# Patient Record
Sex: FEMALE | Race: WHITE | NOT HISPANIC OR LATINO | Employment: FULL TIME | URBAN - NONMETROPOLITAN AREA
[De-identification: names, ages, dates, MRNs, and addresses within clinical notes are randomized per-mention and may not be internally consistent; named-entity substitution may affect disease eponyms.]

---

## 2017-05-26 ENCOUNTER — TRANSCRIBE ORDERS (OUTPATIENT)
Dept: ADMINISTRATIVE | Facility: HOSPITAL | Age: 47
End: 2017-05-26

## 2017-06-06 ENCOUNTER — HOSPITAL ENCOUNTER (OUTPATIENT)
Dept: NEUROLOGY | Age: 47
Discharge: HOME | End: 2017-06-06
Payer: COMMERCIAL

## 2017-06-06 PROCEDURE — 95886 MUSC TEST DONE W/N TEST COMP: CPT | Performed by: PSYCHIATRY & NEUROLOGY

## 2017-06-06 PROCEDURE — 95886 MUSC TEST DONE W/N TEST COMP: CPT

## 2017-06-06 PROCEDURE — 95911 NRV CNDJ TEST 9-10 STUDIES: CPT

## 2017-06-06 PROCEDURE — 95911 NRV CNDJ TEST 9-10 STUDIES: CPT | Performed by: PSYCHIATRY & NEUROLOGY

## 2017-06-09 ENCOUNTER — TRANSCRIBE ORDERS (OUTPATIENT)
Dept: ADMINISTRATIVE | Facility: HOSPITAL | Age: 47
End: 2017-06-09

## 2017-06-09 DIAGNOSIS — R79.89 ELEVATED PTHRP LEVEL: Primary | ICD-10-CM

## 2017-06-19 ENCOUNTER — APPOINTMENT (OUTPATIENT)
Dept: NUCLEAR MEDICINE | Facility: HOSPITAL | Age: 47
End: 2017-06-19
Attending: FAMILY MEDICINE

## 2017-08-01 ENCOUNTER — HOSPITAL ENCOUNTER (OUTPATIENT)
Dept: MRI IMAGING | Age: 47
Discharge: HOME | End: 2017-08-01
Payer: COMMERCIAL

## 2017-08-01 DIAGNOSIS — M19.012 ARTHRITIS OF LEFT SHOULDER REGION: ICD-10-CM

## 2017-10-17 ENCOUNTER — HOSPITAL ENCOUNTER (OUTPATIENT)
Dept: NUCLEAR MEDICINE | Facility: HOSPITAL | Age: 47
Discharge: HOME | End: 2017-10-17
Attending: FAMILY MEDICINE

## 2017-10-17 DIAGNOSIS — R79.89 ELEVATED PTHRP LEVEL: ICD-10-CM

## 2017-10-17 PROCEDURE — 0 TECHNETIUM SESTAMIBI: Performed by: FAMILY MEDICINE

## 2017-10-17 PROCEDURE — 78071 PARATHYRD PLANAR W/WO SUBTRJ: CPT

## 2017-10-17 PROCEDURE — A9500 TC99M SESTAMIBI: HCPCS | Performed by: FAMILY MEDICINE

## 2017-10-17 PROCEDURE — 78070 PARATHYROID PLANAR IMAGING: CPT

## 2017-10-17 RX ADMIN — TECHNETIUM TC-99M SESTAMIBI 1 DOSE: 1 INJECTION INTRAVENOUS at 08:30

## 2017-10-26 ENCOUNTER — HOSPITAL ENCOUNTER (OUTPATIENT)
Dept: LAB | Age: 47
Discharge: HOME | End: 2017-10-26
Payer: COMMERCIAL

## 2017-10-26 ENCOUNTER — APPOINTMENT (OUTPATIENT)
Dept: LAB | Age: 47
End: 2017-10-26
Payer: COMMERCIAL

## 2017-10-26 LAB
24HR URINE VOLUME (ML): 900 ML
CALCIUM 24 HOUR URINE: 245 MG/24HR (ref 100–300)
CREATININE 24 HOUR URINE: 1.6 G/24HR (ref 1–2)
SODIUM 24 HOUR URINE: 92 MMOL/24 HR (ref 40–220)

## 2017-10-26 PROCEDURE — 84300 ASSAY OF URINE SODIUM: CPT

## 2017-10-26 PROCEDURE — 82340 ASSAY OF CALCIUM IN URINE: CPT

## 2018-01-10 ENCOUNTER — TRANSCRIBE ORDERS (OUTPATIENT)
Dept: ADMINISTRATIVE | Facility: HOSPITAL | Age: 48
End: 2018-01-10

## 2018-01-10 DIAGNOSIS — D47.2 MONOCLONAL GAMMOPATHY: ICD-10-CM

## 2018-01-10 DIAGNOSIS — D72.829 LEUKOCYTOSIS, UNSPECIFIED TYPE: Primary | ICD-10-CM

## 2018-01-23 ENCOUNTER — HOSPITAL ENCOUNTER (OUTPATIENT)
Dept: CT IMAGING | Facility: HOSPITAL | Age: 48
Discharge: HOME | End: 2018-01-23
Attending: INTERNAL MEDICINE | Admitting: INTERNAL MEDICINE

## 2018-01-23 VITALS
WEIGHT: 293 LBS | OXYGEN SATURATION: 99 % | HEIGHT: 64 IN | TEMPERATURE: 98.7 F | BODY MASS INDEX: 50.02 KG/M2 | SYSTOLIC BLOOD PRESSURE: 142 MMHG | RESPIRATION RATE: 18 BRPM | DIASTOLIC BLOOD PRESSURE: 92 MMHG | HEART RATE: 99 BPM

## 2018-01-23 DIAGNOSIS — D47.2 MONOCLONAL GAMMOPATHY: ICD-10-CM

## 2018-01-23 DIAGNOSIS — D72.829 LEUKOCYTOSIS, UNSPECIFIED TYPE: ICD-10-CM

## 2018-01-23 LAB
APTT PPP: 25.8 SECONDS (ref 24.1–34.8)
INR PPP: 0.92 (ref 0.91–1.09)
PLATELET # BLD AUTO: 301 10*3/MM3 (ref 130–400)
PROTHROMBIN TIME: 12.6 SECONDS (ref 11.9–14.6)

## 2018-01-23 PROCEDURE — 85610 PROTHROMBIN TIME: CPT | Performed by: INTERNAL MEDICINE

## 2018-01-23 PROCEDURE — 88313 SPECIAL STAINS GROUP 2: CPT | Performed by: INTERNAL MEDICINE

## 2018-01-23 PROCEDURE — 88311 DECALCIFY TISSUE: CPT | Performed by: INTERNAL MEDICINE

## 2018-01-23 PROCEDURE — 25010000002 MIDAZOLAM PER 1 MG: Performed by: RADIOLOGY

## 2018-01-23 PROCEDURE — 85049 AUTOMATED PLATELET COUNT: CPT | Performed by: INTERNAL MEDICINE

## 2018-01-23 PROCEDURE — 88305 TISSUE EXAM BY PATHOLOGIST: CPT | Performed by: INTERNAL MEDICINE

## 2018-01-23 PROCEDURE — 77012 CT SCAN FOR NEEDLE BIOPSY: CPT

## 2018-01-23 PROCEDURE — 25010000002 FENTANYL CITRATE (PF) 100 MCG/2ML SOLUTION: Performed by: RADIOLOGY

## 2018-01-23 PROCEDURE — 85730 THROMBOPLASTIN TIME PARTIAL: CPT | Performed by: INTERNAL MEDICINE

## 2018-01-23 RX ORDER — MIDAZOLAM HYDROCHLORIDE 1 MG/ML
INJECTION INTRAMUSCULAR; INTRAVENOUS
Status: COMPLETED | OUTPATIENT
Start: 2018-01-23 | End: 2018-01-23

## 2018-01-23 RX ORDER — SODIUM CHLORIDE 0.9 % (FLUSH) 0.9 %
1-10 SYRINGE (ML) INJECTION AS NEEDED
Status: DISCONTINUED | OUTPATIENT
Start: 2018-01-23 | End: 2018-01-24 | Stop reason: HOSPADM

## 2018-01-23 RX ORDER — ERGOCALCIFEROL 1.25 MG/1
50000 CAPSULE ORAL WEEKLY
COMMUNITY
End: 2020-03-03

## 2018-01-23 RX ORDER — LIDOCAINE HYDROCHLORIDE 10 MG/ML
INJECTION, SOLUTION INFILTRATION; PERINEURAL
Status: COMPLETED | OUTPATIENT
Start: 2018-01-23 | End: 2018-01-23

## 2018-01-23 RX ORDER — MONTELUKAST SODIUM 10 MG/1
10 TABLET ORAL NIGHTLY
COMMUNITY
End: 2020-03-03

## 2018-01-23 RX ORDER — FENTANYL CITRATE 50 UG/ML
INJECTION, SOLUTION INTRAMUSCULAR; INTRAVENOUS
Status: COMPLETED | OUTPATIENT
Start: 2018-01-23 | End: 2018-01-23

## 2018-01-23 RX ORDER — PAROXETINE 10 MG/1
10 TABLET, FILM COATED ORAL EVERY MORNING
COMMUNITY
End: 2020-03-03

## 2018-01-23 RX ADMIN — FENTANYL CITRATE 50 MCG: 50 INJECTION, SOLUTION INTRAMUSCULAR; INTRAVENOUS at 10:06

## 2018-01-23 RX ADMIN — MIDAZOLAM 1 MG: 1 INJECTION INTRAMUSCULAR; INTRAVENOUS at 10:11

## 2018-01-23 RX ADMIN — FENTANYL CITRATE 25 MCG: 50 INJECTION, SOLUTION INTRAMUSCULAR; INTRAVENOUS at 10:15

## 2018-01-23 RX ADMIN — FENTANYL CITRATE 25 MCG: 50 INJECTION, SOLUTION INTRAMUSCULAR; INTRAVENOUS at 10:11

## 2018-01-23 RX ADMIN — LIDOCAINE HYDROCHLORIDE 5 ML: 10 INJECTION, SOLUTION INFILTRATION; PERINEURAL at 10:06

## 2018-01-23 RX ADMIN — MIDAZOLAM 1 MG: 1 INJECTION INTRAMUSCULAR; INTRAVENOUS at 10:06

## 2018-03-30 ENCOUNTER — HOSPITAL ENCOUNTER (EMERGENCY)
Facility: HOSPITAL | Age: 48
Discharge: HOME | End: 2018-03-30
Attending: EMERGENCY MEDICINE | Admitting: EMERGENCY MEDICINE

## 2018-03-30 ENCOUNTER — APPOINTMENT (OUTPATIENT)
Dept: GENERAL RADIOLOGY | Facility: HOSPITAL | Age: 48
End: 2018-03-30

## 2018-03-30 VITALS
BODY MASS INDEX: 50.02 KG/M2 | WEIGHT: 293 LBS | HEART RATE: 79 BPM | DIASTOLIC BLOOD PRESSURE: 67 MMHG | TEMPERATURE: 96.5 F | HEIGHT: 64 IN | OXYGEN SATURATION: 94 % | SYSTOLIC BLOOD PRESSURE: 124 MMHG | RESPIRATION RATE: 16 BRPM

## 2018-03-30 DIAGNOSIS — I47.1 SVT (SUPRAVENTRICULAR TACHYCARDIA) (HCC): Primary | ICD-10-CM

## 2018-03-30 LAB
ALBUMIN SERPL-MCNC: 4.2 G/DL (ref 3.5–5)
ALBUMIN/GLOB SERPL: 1 G/DL (ref 1.1–2.5)
ALP SERPL-CCNC: 116 U/L (ref 24–120)
ALT SERPL W P-5'-P-CCNC: 115 U/L (ref 0–54)
ANION GAP SERPL CALCULATED.3IONS-SCNC: 12 MMOL/L (ref 4–13)
AST SERPL-CCNC: 112 U/L (ref 7–45)
BASOPHILS # BLD AUTO: 0.04 10*3/MM3 (ref 0–0.2)
BASOPHILS NFR BLD AUTO: 0.3 % (ref 0–2)
BILIRUB SERPL-MCNC: 0.5 MG/DL (ref 0.1–1)
BUN BLD-MCNC: 7 MG/DL (ref 5–21)
BUN/CREAT SERPL: 11.3 (ref 7–25)
CALCIUM SPEC-SCNC: 9 MG/DL (ref 8.4–10.4)
CHLORIDE SERPL-SCNC: 101 MMOL/L (ref 98–110)
CO2 SERPL-SCNC: 29 MMOL/L (ref 24–31)
CREAT BLD-MCNC: 0.62 MG/DL (ref 0.5–1.4)
DEPRECATED RDW RBC AUTO: 41.7 FL (ref 40–54)
EOSINOPHIL # BLD AUTO: 0.12 10*3/MM3 (ref 0–0.7)
EOSINOPHIL NFR BLD AUTO: 1 % (ref 0–4)
ERYTHROCYTE [DISTWIDTH] IN BLOOD BY AUTOMATED COUNT: 13.3 % (ref 12–15)
GFR SERPL CREATININE-BSD FRML MDRD: 104 ML/MIN/1.73
GLOBULIN UR ELPH-MCNC: 4.4 GM/DL
GLUCOSE BLD-MCNC: 168 MG/DL (ref 70–100)
HCT VFR BLD AUTO: 46 % (ref 37–47)
HGB BLD-MCNC: 15.1 G/DL (ref 12–16)
IMM GRANULOCYTES # BLD: 0.06 10*3/MM3 (ref 0–0.03)
IMM GRANULOCYTES NFR BLD: 0.5 % (ref 0–5)
LYMPHOCYTES # BLD AUTO: 1.79 10*3/MM3 (ref 0.72–4.86)
LYMPHOCYTES NFR BLD AUTO: 15.6 % (ref 15–45)
MCH RBC QN AUTO: 28.2 PG (ref 28–32)
MCHC RBC AUTO-ENTMCNC: 32.8 G/DL (ref 33–36)
MCV RBC AUTO: 85.8 FL (ref 82–98)
MONOCYTES # BLD AUTO: 0.68 10*3/MM3 (ref 0.19–1.3)
MONOCYTES NFR BLD AUTO: 5.9 % (ref 4–12)
NEUTROPHILS # BLD AUTO: 8.76 10*3/MM3 (ref 1.87–8.4)
NEUTROPHILS NFR BLD AUTO: 76.7 % (ref 39–78)
NRBC BLD MANUAL-RTO: 0 /100 WBC (ref 0–0)
PLATELET # BLD AUTO: 347 10*3/MM3 (ref 130–400)
PMV BLD AUTO: 9.8 FL (ref 6–12)
POTASSIUM BLD-SCNC: 4 MMOL/L (ref 3.5–5.3)
PROT SERPL-MCNC: 8.6 G/DL (ref 6.3–8.7)
RBC # BLD AUTO: 5.36 10*6/MM3 (ref 4.2–5.4)
SODIUM BLD-SCNC: 142 MMOL/L (ref 135–145)
TSH SERPL DL<=0.05 MIU/L-ACNC: 0.14 MIU/ML (ref 0.47–4.68)
WBC NRBC COR # BLD: 11.45 10*3/MM3 (ref 4.8–10.8)

## 2018-03-30 PROCEDURE — 93010 ELECTROCARDIOGRAM REPORT: CPT | Performed by: INTERNAL MEDICINE

## 2018-03-30 PROCEDURE — 84443 ASSAY THYROID STIM HORMONE: CPT | Performed by: EMERGENCY MEDICINE

## 2018-03-30 PROCEDURE — 93005 ELECTROCARDIOGRAM TRACING: CPT | Performed by: EMERGENCY MEDICINE

## 2018-03-30 PROCEDURE — 71045 X-RAY EXAM CHEST 1 VIEW: CPT

## 2018-03-30 PROCEDURE — 85025 COMPLETE CBC W/AUTO DIFF WBC: CPT | Performed by: EMERGENCY MEDICINE

## 2018-03-30 PROCEDURE — 36415 COLL VENOUS BLD VENIPUNCTURE: CPT | Performed by: EMERGENCY MEDICINE

## 2018-03-30 PROCEDURE — 25010000002 ADENOSINE PER 6 MG: Performed by: EMERGENCY MEDICINE

## 2018-03-30 PROCEDURE — 99284 EMERGENCY DEPT VISIT MOD MDM: CPT

## 2018-03-30 PROCEDURE — 96374 THER/PROPH/DIAG INJ IV PUSH: CPT

## 2018-03-30 PROCEDURE — 80053 COMPREHEN METABOLIC PANEL: CPT | Performed by: EMERGENCY MEDICINE

## 2018-03-30 RX ORDER — HYDROXYZINE PAMOATE 50 MG/1
50 CAPSULE ORAL 3 TIMES DAILY PRN
Qty: 10 CAPSULE | Refills: 0 | Status: SHIPPED | OUTPATIENT
Start: 2018-03-30 | End: 2020-03-03

## 2018-03-30 RX ORDER — METOPROLOL SUCCINATE 25 MG/1
50 TABLET, EXTENDED RELEASE ORAL 2 TIMES DAILY
COMMUNITY

## 2018-03-30 RX ORDER — ADENOSINE 3 MG/ML
6 INJECTION, SOLUTION INTRAVENOUS ONCE
Status: COMPLETED | OUTPATIENT
Start: 2018-03-30 | End: 2018-03-30

## 2018-03-30 RX ADMIN — ADENOSINE 6 MG: 3 INJECTION INTRAVENOUS at 08:25

## 2018-04-03 NOTE — ED NOTES
"ED Call Back Questions    1. How are you doing since leaving the Emergency Department?    Doing ok, better than I was  2. Do you have any questions about your discharge instructions? No     3. Have you filled your new prescriptions yet? Yes   a. Do you have any questions about those medications? No     4. Were you able to make a follow-up appointment with the physician? Yes     5. Do you have a primary care physician? Yes   a. If No, would you like for me to set you up with one? N/A  i. If Yes, “I will have our ED  give you a call right back at this number to work with you on the best time for an appointment.”    6. We are always looking to get better at what we do. Do you have any suggestions for what we can do to be even better? No   a. If Yes, \"Thank you for sharing your concerns. I apologize. I will follow up with our manager and patient . Would you like someone to call you back?\" No     7. Is there anything else I can do for you? No   Visit was good     Jethro Buenrostro  04/03/18 1239    "

## 2018-04-04 ENCOUNTER — TRANSCRIBE ORDERS (OUTPATIENT)
Dept: ADMINISTRATIVE | Facility: HOSPITAL | Age: 48
End: 2018-04-04

## 2018-04-04 DIAGNOSIS — D47.2 MONOCLONAL GAMMOPATHY: Primary | ICD-10-CM

## 2018-04-11 ENCOUNTER — HOSPITAL ENCOUNTER (OUTPATIENT)
Dept: CT IMAGING | Facility: HOSPITAL | Age: 48
Discharge: HOME | End: 2018-04-11
Attending: INTERNAL MEDICINE | Admitting: RADIOLOGY

## 2018-04-11 VITALS
WEIGHT: 293 LBS | RESPIRATION RATE: 16 BRPM | SYSTOLIC BLOOD PRESSURE: 136 MMHG | OXYGEN SATURATION: 98 % | BODY MASS INDEX: 50.02 KG/M2 | HEART RATE: 84 BPM | DIASTOLIC BLOOD PRESSURE: 99 MMHG | HEIGHT: 64 IN

## 2018-04-11 DIAGNOSIS — D47.2 MONOCLONAL GAMMOPATHY: ICD-10-CM

## 2018-04-11 LAB
APTT PPP: 21.3 SECONDS (ref 24.1–34.8)
BASOPHILS # BLD AUTO: 0.04 10*3/MM3 (ref 0–0.2)
BASOPHILS NFR BLD AUTO: 0.5 % (ref 0–2)
DEPRECATED RDW RBC AUTO: 40.2 FL (ref 40–54)
EOSINOPHIL # BLD AUTO: 0.17 10*3/MM3 (ref 0–0.7)
EOSINOPHIL NFR BLD AUTO: 2.1 % (ref 0–4)
ERYTHROCYTE [DISTWIDTH] IN BLOOD BY AUTOMATED COUNT: 13 % (ref 12–15)
HCT VFR BLD AUTO: 43.5 % (ref 37–47)
HGB BLD-MCNC: 14.1 G/DL (ref 12–16)
IMM GRANULOCYTES # BLD: 0.05 10*3/MM3 (ref 0–0.03)
IMM GRANULOCYTES NFR BLD: 0.6 % (ref 0–5)
INR PPP: 0.87 (ref 0.91–1.09)
LYMPHOCYTES # BLD AUTO: 1.71 10*3/MM3 (ref 0.72–4.86)
LYMPHOCYTES NFR BLD AUTO: 21.1 % (ref 15–45)
MCH RBC QN AUTO: 27.8 PG (ref 28–32)
MCHC RBC AUTO-ENTMCNC: 32.4 G/DL (ref 33–36)
MCV RBC AUTO: 85.6 FL (ref 82–98)
MONOCYTES # BLD AUTO: 0.47 10*3/MM3 (ref 0.19–1.3)
MONOCYTES NFR BLD AUTO: 5.8 % (ref 4–12)
NEUTROPHILS # BLD AUTO: 5.67 10*3/MM3 (ref 1.87–8.4)
NEUTROPHILS NFR BLD AUTO: 69.9 % (ref 39–78)
NRBC BLD MANUAL-RTO: 0 /100 WBC (ref 0–0)
PLATELET # BLD AUTO: 300 10*3/MM3 (ref 130–400)
PMV BLD AUTO: 9.7 FL (ref 6–12)
PROTHROMBIN TIME: 12.1 SECONDS (ref 11.9–14.6)
RBC # BLD AUTO: 5.08 10*6/MM3 (ref 4.2–5.4)
WBC NRBC COR # BLD: 8.11 10*3/MM3 (ref 4.8–10.8)

## 2018-04-11 PROCEDURE — 88313 SPECIAL STAINS GROUP 2: CPT | Performed by: INTERNAL MEDICINE

## 2018-04-11 PROCEDURE — 85610 PROTHROMBIN TIME: CPT | Performed by: INTERNAL MEDICINE

## 2018-04-11 PROCEDURE — 85025 COMPLETE CBC W/AUTO DIFF WBC: CPT | Performed by: INTERNAL MEDICINE

## 2018-04-11 PROCEDURE — 88311 DECALCIFY TISSUE: CPT | Performed by: INTERNAL MEDICINE

## 2018-04-11 PROCEDURE — 88305 TISSUE EXAM BY PATHOLOGIST: CPT | Performed by: INTERNAL MEDICINE

## 2018-04-11 PROCEDURE — 36415 COLL VENOUS BLD VENIPUNCTURE: CPT

## 2018-04-11 PROCEDURE — 85730 THROMBOPLASTIN TIME PARTIAL: CPT | Performed by: INTERNAL MEDICINE

## 2018-04-11 PROCEDURE — 25010000002 MIDAZOLAM PER 1 MG: Performed by: RADIOLOGY

## 2018-04-11 PROCEDURE — 25010000002 FENTANYL CITRATE (PF) 100 MCG/2ML SOLUTION: Performed by: RADIOLOGY

## 2018-04-11 PROCEDURE — 77012 CT SCAN FOR NEEDLE BIOPSY: CPT

## 2018-04-11 RX ORDER — MIDAZOLAM HYDROCHLORIDE 1 MG/ML
INJECTION INTRAMUSCULAR; INTRAVENOUS
Status: COMPLETED | OUTPATIENT
Start: 2018-04-11 | End: 2018-04-11

## 2018-04-11 RX ORDER — SODIUM CHLORIDE 0.9 % (FLUSH) 0.9 %
1-10 SYRINGE (ML) INJECTION AS NEEDED
Status: CANCELLED | OUTPATIENT
Start: 2018-04-11

## 2018-04-11 RX ORDER — LIDOCAINE HYDROCHLORIDE 10 MG/ML
INJECTION, SOLUTION INFILTRATION; PERINEURAL
Status: COMPLETED | OUTPATIENT
Start: 2018-04-11 | End: 2018-04-11

## 2018-04-11 RX ORDER — FENTANYL CITRATE 50 UG/ML
INJECTION, SOLUTION INTRAMUSCULAR; INTRAVENOUS
Status: COMPLETED | OUTPATIENT
Start: 2018-04-11 | End: 2018-04-11

## 2018-04-11 RX ADMIN — LIDOCAINE HYDROCHLORIDE 20 ML: 10 INJECTION, SOLUTION INFILTRATION; PERINEURAL at 11:21

## 2018-04-11 RX ADMIN — FENTANYL CITRATE 50 MCG: 50 INJECTION, SOLUTION INTRAMUSCULAR; INTRAVENOUS at 11:14

## 2018-04-11 RX ADMIN — FENTANYL CITRATE 25 MCG: 50 INJECTION, SOLUTION INTRAMUSCULAR; INTRAVENOUS at 11:22

## 2018-04-11 RX ADMIN — MIDAZOLAM 2 MG: 1 INJECTION INTRAMUSCULAR; INTRAVENOUS at 11:14

## 2018-04-11 NOTE — NURSING NOTE
Patient tolerated procedure well, no adversities, area covered with bandaid, no oozing or hematoma. MD gave instruction to patient and patient verbalized understanding.

## 2018-04-11 NOTE — PRE-PROCEDURE NOTE
This patient has been assessed in the Radiology Department prior to a CT guided bone marrow biopsy.  The airway has been assessed and the ASA/Mallampati score has been documented in Epic.  The H&P is in [facility], has been reviewed, and there is no significant change.  Current lab values have been reviewed.  Risks, benefits, and alternatives have been reviewed.

## 2018-04-18 LAB
CYTO UR: NORMAL
LAB AP CASE REPORT: NORMAL
Lab: NORMAL
Lab: NORMAL
PATH REPORT.ADDENDUM SPEC: NORMAL
PATH REPORT.FINAL DX SPEC: NORMAL
PATH REPORT.GROSS SPEC: NORMAL

## 2018-05-21 NOTE — PROGRESS NOTES
YOB: 1970  Location: [facility]  Location Address: [address] [place] [address], [facility], [address] [place] [address]  Location Phone: [phone]    Chief Complaint   Patient presents with   • parathyroid       History of Present Illness  Arminda Mccloud is a 48 y.o. female.  Arminda Mccloud is here for evaluation of ENT complaints. The patient has had problems with hyperthyroidism, dysphagia and neck tightness and history of SVT. The symptoms are not localized to a particular location. The patient has had moderate symptoms. The symptoms have been present for the last several years. The symptoms are aggravated by  no identifiable factors. The symptoms are improved by no identifiable factors.      Study Result     EXAMINATION: Parathyroid scan 10/17/2017     HISTORY: Elevated parathyroid hormone with low calcium level x5 months.     FINDINGS: Following intravenous injection of the radiopharmaceutical  planar images were obtained over the neck and upper chest both  immediately and following a delay. SPECT imaging was obtained on the  delayed sequences. There is normal physiologic uptake of the  radiopharmaceutical within the thyroid gland. Washout phase sequencing  demonstrates uptake within the parotid and submandibular glands. No  focal intense areas of abnormal uptake are identified to suggest  parathyroid adenoma.     IMPRESSION:  . Normal parathyroid scan with no evidence of abnormal foci  of activity on the delayed images to suggest a parathyroid adenoma.  This report was finalized on 10/17/2017 14:00 by Dr. Facundo David MD.         3/30/18   Calcium 8.4 - 10.4 mg/dL 9.0      TSH 0.470 - 4.680 mIU/mL 0.143       18 PTH 73     Past Medical History:   Diagnosis Date   • Arthritis    • Chronic fatigue    • Depression    • Generalized anxiety disorder    • Goiter 2018   • Hypercalcemia    • Hyperparathyroidism    • Hypothyroidism    • Irritable bowel syndrome    • Multinodular goiter    • Obesity     • Onychomycosis    • Panic    • Supraventricular tachycardia    • Tendonitis    • Vitamin D deficiency        Past Surgical History:   Procedure Laterality Date   • BONY PELVIS SURGERY     • CHOLECYSTECTOMY     • HAND SURGERY     • JOINT REPLACEMENT Left     knee   • TONSILLECTOMY AND ADENOIDECTOMY         Outpatient Prescriptions Marked as Taking for the 5/22/18 encounter (Office Visit) with Hal Velazquez MD   Medication Sig Dispense Refill   • hydrOXYzine (VISTARIL) 50 MG capsule Take 1 capsule by mouth 3 (Three) Times a Day As Needed for Itching. 10 capsule 0   • metoprolol succinate XL (TOPROL-XL) 25 MG 24 hr tablet Take 25 mg by mouth 2 (Two) Times a Day.     • montelukast (SINGULAIR) 10 MG tablet Take 10 mg by mouth Every Night.     • PARoxetine (PAXIL) 10 MG tablet Take 10 mg by mouth Every Morning.     • vitamin D (ERGOCALCIFEROL) 84238 units capsule capsule Take 50,000 Units by mouth 1 (One) Time Per Week.         Lamisil [terbinafine hcl] and Buspirone    Family History   Problem Relation Age of Onset   • No Known Problems Mother    • Hypertension Father    • Diabetes Paternal Grandmother    • Breast cancer Paternal Aunt        Social History     Social History   • Marital status: Single     Spouse name: N/A   • Number of children: N/A   • Years of education: N/A     Occupational History   • Not on file.     Social History Main Topics   • Smoking status: Never Smoker   • Smokeless tobacco: Never Used   • Alcohol use No   • Drug use: No   • Sexual activity: Not on file     Other Topics Concern   • Not on file     Social History Narrative   • No narrative on file       Review of Systems   Constitutional: Negative for activity change, appetite change, chills, diaphoresis, fatigue, fever and unexpected weight change.   HENT: Positive for trouble swallowing. Negative for congestion, dental problem, drooling, ear discharge, ear pain, facial swelling, hearing loss, mouth sores, nosebleeds, postnasal drip,  rhinorrhea, sinus pressure, sneezing, sore throat, tinnitus and voice change.         Goiter  Hyperthyoidism   Eyes: Negative.    Respiratory: Negative.    Cardiovascular:        SVT   Gastrointestinal: Negative.    Endocrine: Negative.    Skin: Negative.    Allergic/Immunologic: Negative for environmental allergies, food allergies and immunocompromised state.   Neurological: Negative.    Hematological: Negative.    Psychiatric/Behavioral: Negative.        Vitals:    05/22/18 1338   BP: 150/90   Pulse: 80   Temp: 98 °F (36.7 °C)       Body mass index is 54.58 kg/m².    Objective     Physical Exam  CONSTITUTIONAL: well nourished, alert, oriented, in no acute distress     COMMUNICATION AND VOICE: able to communicate normally, normal voice quality    HEAD: normocephalic, no lesions, atraumatic, no tenderness, no masses     FACE: appearance normal, no lesions, no tenderness, no deformities, facial motion symmetric    SALIVARY GLANDS: parotid glands with no tenderness, no swelling, no masses, submandibular glands with normal size, nontender    EYES: ocular motility normal, eyelids normal, orbits normal, no proptosis, conjunctiva normal , pupils equal, round     EARS:  Hearing: response to conversational voice normal bilaterally   External Ears: auricles without lesions  Otoscopic: tympanic membrane appearance normal, no lesions, no perforation, normal mobility, no fluid    NOSE:  External Nose: structure normal, no tenderness on palpation, no nasal discharge, no lesions, no evidence of trauma, nostrils patent   Intranasal Exam: nasal mucosa normal, vestibule within normal limits, inferior turbinate normal, nasal septum midline   Nasopharynx:     ORAL:  Lips: upper and lower lips without lesion   Teeth: dentition within normal limits for age   Gums: gingivae healthy   Oral Mucosa: oral mucosa normal, no mucosal lesions   Floor of Mouth: Warthin’s duct patent, mucosa normal  Tongue: lingual mucosa normal without lesions,  normal tongue mobility   Palate: soft and hard palates with normal mucosa and structure  Oropharynx: oropharyngeal mucosa normal    NECK: neck appearance normal, no mass,  noted without erythema or tenderness    THYROID: mild thyromegaly, no tenderness, nodule on left lobe felt, position midline     LYMPH NODES: no lymphadenopathy    CHEST/RESPIRATORY: respiratory effort normal, normal breath sounds     CARDIOVASCULAR: rate and rhythm normal, extremities without cyanosis or edema      NEUROLOGIC/PSYCHIATRIC: oriented to time, place and person, mood normal, affect appropriate, CN II-XII intact grossly    Assessment/Plan   Problems Addressed this Visit        Endocrine    Goiter - Primary    Relevant Orders    US Thyroid (Completed)    US Guided Thyroid Biopsy    Multinodular goiter    Relevant Orders    US Guided Thyroid Biopsy    Hyperthyroidism    Relevant Orders    US Guided Thyroid Biopsy    NM Thyroid Uptake & Scan        * Surgery not found *  Orders Placed This Encounter   Procedures   • US Thyroid     Order Specific Question:   Reason for Exam:     Answer:   goiter   • US Guided Thyroid Biopsy     Standing Status:   Future     Standing Expiration Date:   5/22/2019     Order Specific Question:   Reason for Exam:     Answer:   left thyroid nodule   • NM Thyroid Uptake & Scan     Order Specific Question:   Reason for Exam:     Answer:   Hyperthyroidism     Order Specific Question:   Patient Pregnant     Answer:   No     Order Specific Question:   Is the patient breastfeeding?     Answer:   No     Return for Follow-up 4-6 weeks with Dr. Velazquez.       Patient Instructions   Will get thyroid uptake scan and FNA, follow-up in 4-6 weeks to discuss results and treatment options.    ###### BMI  #####   MyPlate from USDA  The general, healthful diet is based on the 2010 Dietary Guidelines for Americans. The amount of food you need to eat from each food group depends on your age, sex, and level of physical activity and  can be individualized by a dietitian. Go to ChooseMyPlate.gov for more information.  What do I need to know about the MyPlate plan?  · Enjoy your food, but eat less.  · Avoid oversized portions.  ¨ ½ of your plate should include fruits and vegetables.  ¨ ¼ of your plate should be grains.  ¨ ¼ of your plate should be protein.  Grains   · Make at least half of your grains whole grains.  · For a 2,000 calorie daily food plan, eat 6 oz every day.  · 1 oz is about 1 slice bread, 1 cup cereal, or ½ cup cooked rice, cereal, or pasta.  Vegetables   · Make half your plate fruits and vegetables.  · For a 2,000 calorie daily food plan, eat 2½ cups every day.  · 1 cup is about 1 cup raw or cooked vegetables or vegetable juice or 2 cups raw leafy greens.  Fruits   · Make half your plate fruits and vegetables.  · For a 2,000 calorie daily food plan, eat 2 cups every day.  · 1 cup is about 1 cup fruit or 100% fruit juice or ½ cup dried fruit.  Protein   · For a 2,000 calorie daily food plan, eat 5½ oz every day.  · 1 oz is about 1 oz meat, poultry, or fish, ¼ cup cooked beans, 1 egg, 1 Tbsp peanut butter, or ½ oz nuts or seeds.  Dairy   · Switch to fat-free or low-fat (1%) milk.  · For a 2,000 calorie daily food plan, eat 3 cups every day.  · 1 cup is about 1 cup milk or yogurt or soy milk (soy beverage), 1½ oz natural cheese, or 2 oz processed cheese.  Fats, Oils, and Empty Calories   · Only small amounts of oils are recommended.  · Empty calories are calories from solid fats or added sugars.  · Compare sodium in foods like soup, bread, and frozen meals. Choose the foods with lower numbers.  · Drink water instead of sugary drinks.  What foods can I eat?  Grains   Whole grains such as whole wheat, quinoa, millet, and bulgur. Bread, rolls, and pasta made from whole grains. Brown or wild rice. Hot or cold cereals made from whole grains and without added sugar.  Vegetables   All fresh vegetables, especially fresh red, dark green, or  orange vegetables. Peas and beans. Low-sodium frozen or canned vegetables prepared without added salt. Low-sodium vegetable juices.  Fruits   All fresh, frozen, and dried fruits. Canned fruit packed in water or fruit juice without added sugar. Fruit juices without added sugar.  Meats and Other Protein Sources   Boiled, baked, or grilled lean meat trimmed of fat. Skinless poultry. Fresh seafood and shellfish. Canned seafood packed in water. Unsalted nuts and unsalted nut butters. Tofu. Dried beans and pea. Eggs.  Dairy   Low-fat or fat-free milk, yogurt, and cheeses.  Sweets and Desserts   Frozen desserts made from low-fat milk.  Fats and Oils   Olive, peanut, and canola oils and margarine. Salad dressing and mayonnaise made from these oils.  Other   Soups and casseroles made from allowed ingredients and without added fat or salt.  The items listed above may not be a complete list of recommended foods or beverages. Contact your dietitian for more options.   What foods are not recommended?  Grains   Sweetened, low-fiber cereals. Packaged baked goods. Snack crackers and chips. Cheese crackers, butter crackers, and biscuits. Frozen waffles, sweet breads, doughnuts, pastries, packaged baking mixes, pancakes, cakes, and cookies.  Vegetables   Regular canned or frozen vegetables or vegetables prepared with salt. Canned tomatoes. Canned tomato sauce. Fried vegetables. Vegetables in cream sauce or cheese sauce.  Fruits   Fruits packed in syrup or made with added sugar.  Meats and Other Protein Sources   Marbled or fatty meats such as ribs. Poultry with skin. Fried meats, poultry, eggs, or fish. Sausages, hot dogs, and deli meats such as pastrami, bologna, or salami.  Dairy   Whole milk, cream, cheeses made from whole milk, sour cream. Ice cream or yogurt made from whole milk or with added sugar.  Beverages   For adults, no more than one alcoholic drink per day. Regular soft drinks or other sugary beverages. Juice  drinks.  Sweets and Desserts   Sugary or fatty desserts, candy, and other sweets.  Fats and Oils   Solid shortening or partially hydrogenated oils. Solid margarine. Margarine that contains trans fats. Butter.  The items listed above may not be a complete list of foods and beverages to avoid. Contact your dietitian for more information.   This information is not intended to replace advice given to you by your health care provider. Make sure you discuss any questions you have with your health care provider.  Document Released: 01/06/2009 Document Revised: 05/25/2017 Document Reviewed: 11/26/2014  [employer] Interactive Patient Education © 2017 [employer]     Calorie Counting for Weight Loss  Calories are units of energy. Your body needs a certain amount of calories from food to keep you going throughout the day. When you eat more calories than your body needs, your body stores the extra calories as fat. When you eat fewer calories than your body needs, your body burns fat to get the energy it needs.  Calorie counting means keeping track of how many calories you eat and drink each day. Calorie counting can be helpful if you need to lose weight. If you make sure to eat fewer calories than your body needs, you should lose weight. Ask your health care provider what a healthy weight is for you.  For calorie counting to work, you will need to eat the right number of calories in a day in order to lose a healthy amount of weight per week. A dietitian can help you determine how many calories you need in a day and will give you suggestions on how to reach your calorie goal.  · A healthy amount of weight to lose per week is usually 1-2 lb (0.5-0.9 kg). This usually means that your daily calorie intake should be reduced by 500-750 calories.  · Eating 1,200 - 1,500 calories per day can help most women lose weight.  · Eating 1,500 - 1,800 calories per day can help most men lose weight.  What is my plan?  My goal is to have  __________ calories per day.  If I have this many calories per day, I should lose around __________ pounds per week.  What do I need to know about calorie counting?  In order to meet your daily calorie goal, you will need to:  · Find out how many calories are in each food you would like to eat. Try to do this before you eat.  · Decide how much of the food you plan to eat.  · Write down what you ate and how many calories it had. Doing this is called keeping a food log.  To successfully lose weight, it is important to balance calorie counting with a healthy lifestyle that includes regular activity. Aim for 150 minutes of moderate exercise (such as walking) or 75 minutes of vigorous exercise (such as running) each week.  Where do I find calorie information?     The number of calories in a food can be found on a Nutrition Facts label. If a food does not have a Nutrition Facts label, try to look up the calories online or ask your dietitian for help.  Remember that calories are listed per serving. If you choose to have more than one serving of a food, you will have to multiply the calories per serving by the amount of servings you plan to eat. For example, the label on a package of bread might say that a serving size is 1 slice and that there are 90 calories in a serving. If you eat 1 slice, you will have eaten 90 calories. If you eat 2 slices, you will have eaten 180 calories.  How do I keep a food log?  Immediately after each meal, record the following information in your food log:  · What you ate. Don't forget to include toppings, sauces, and other extras on the food.  · How much you ate. This can be measured in cups, ounces, or number of items.  · How many calories each food and drink had.  · The total number of calories in the meal.  Keep your food log near you, such as in a small notebook in your pocket, or use a mobile papo or website. Some programs will calculate calories for you and show you how many calories you  "have left for the day to meet your goal.  What are some calorie counting tips?  · Use your calories on foods and drinks that will fill you up and not leave you hungry:  ¨ Some examples of foods that fill you up are nuts and nut butters, vegetables, lean proteins, and high-fiber foods like whole grains. High-fiber foods are foods with more than 5 g fiber per serving.  ¨ Drinks such as sodas, specialty coffee drinks, alcohol, and juices have a lot of calories, yet do not fill you up.  · Eat nutritious foods and avoid empty calories. Empty calories are calories you get from foods or beverages that do not have many vitamins or protein, such as candy, sweets, and soda. It is better to have a nutritious high-calorie food (such as an avocado) than a food with few nutrients (such as a bag of chips).  · Know how many calories are in the foods you eat most often. This will help you calculate calorie counts faster.  · Pay attention to calories in drinks. Low-calorie drinks include water and unsweetened drinks.  · Pay attention to nutrition labels for \"low fat\" or \"fat free\" foods. These foods sometimes have the same amount of calories or more calories than the full fat versions. They also often have added sugar, starch, or salt, to make up for flavor that was removed with the fat.  · Find a way of tracking calories that works for you. Get creative. Try different apps or programs if writing down calories does not work for you.  What are some portion control tips?  · Know how many calories are in a serving. This will help you know how many servings of a certain food you can have.  · Use a measuring cup to measure serving sizes. You could also try weighing out portions on a kitchen scale. With time, you will be able to estimate serving sizes for some foods.  · Take some time to put servings of different foods on your favorite plates, bowls, and cups so you know what a serving looks like.  · Try not to eat straight from a bag or " box. Doing this can lead to overeating. Put the amount you would like to eat in a cup or on a plate to make sure you are eating the right portion.  · Use smaller plates, glasses, and bowls to prevent overeating.  · Try not to multitask (for example, watch TV or use your computer) while eating. If it is time to eat, sit down at a table and enjoy your food. This will help you to know when you are full. It will also help you to be aware of what you are eating and how much you are eating.  What are tips for following this plan?  Reading food labels   · Check the calorie count compared to the serving size. The serving size may be smaller than what you are used to eating.  · Check the source of the calories. Make sure the food you are eating is high in vitamins and protein and low in saturated and trans fats.  Shopping   · Read nutrition labels while you shop. This will help you make healthy decisions before you decide to purchase your food.  · Make a grocery list and stick to it.  Cooking   · Try to cook your favorite foods in a healthier way. For example, try baking instead of frying.  · Use low-fat dairy products.  Meal planning   · Use more fruits and vegetables. Half of your plate should be fruits and vegetables.  · Include lean proteins like poultry and fish.  How do I count calories when eating out?  · Ask for smaller portion sizes.  · Consider sharing an entree and sides instead of getting your own entree.  · If you get your own entree, eat only half. Ask for a box at the beginning of your meal and put the rest of your entree in it so you are not tempted to eat it.  · If calories are listed on the menu, choose the lower calorie options.  · Choose dishes that include vegetables, fruits, whole grains, low-fat dairy products, and lean protein.  · Choose items that are boiled, broiled, grilled, or steamed. Stay away from items that are buttered, battered, fried, or served with cream sauce. Items labeled “crispy” are  usually fried, unless stated otherwise.  · Choose water, low-fat milk, unsweetened iced tea, or other drinks without added sugar. If you want an alcoholic beverage, choose a lower calorie option such as a glass of wine or light beer.  · Ask for dressings, sauces, and syrups on the side. These are usually high in calories, so you should limit the amount you eat.  · If you want a salad, choose a garden salad and ask for grilled meats. Avoid extra toppings like dunbar, cheese, or fried items. Ask for the dressing on the side, or ask for olive oil and vinegar or lemon to use as dressing.  · Estimate how many servings of a food you are given. For example, a serving of cooked rice is ½ cup or about the size of half a baseball. Knowing serving sizes will help you be aware of how much food you are eating at restaurants. The list below tells you how big or small some common portion sizes are based on everyday objects:  ¨ 1 oz--4 stacked dice.  ¨ 3 oz--1 deck of cards.  ¨ 1 tsp--1 die.  ¨ 1 Tbsp--½ a ping-pong ball.  ¨ 2 Tbsp--1 ping-pong ball.  ¨ ½ cup--½ baseball.  ¨ 1 cup--1 baseball.  Summary  · Calorie counting means keeping track of how many calories you eat and drink each day. If you eat fewer calories than your body needs, you should lose weight.  · A healthy amount of weight to lose per week is usually 1-2 lb (0.5-0.9 kg). This usually means reducing your daily calorie intake by 500-750 calories.  · The number of calories in a food can be found on a Nutrition Facts label. If a food does not have a Nutrition Facts label, try to look up the calories online or ask your dietitian for help.  · Use your calories on foods and drinks that will fill you up, and not on foods and drinks that will leave you hungry.  · Use smaller plates, glasses, and bowls to prevent overeating.  This information is not intended to replace advice given to you by your health care provider. Make sure you discuss any questions you have with your  health care provider.  Document Released: 12/18/2006 Document Revised: 11/17/2017 Document Reviewed: 11/17/2017  [employer] Interactive Patient Education © 2017 [employer]     Exercising to Lose Weight  Exercising can help you to lose weight. In order to lose weight through exercise, you need to do vigorous-intensity exercise. You can tell that you are exercising with vigorous intensity if you are breathing very hard and fast and cannot hold a conversation while exercising.  Moderate-intensity exercise helps to maintain your current weight. You can tell that you are exercising at a moderate level if you have a higher heart rate and faster breathing, but you are still able to hold a conversation.  How often should I exercise?  Choose an activity that you enjoy and set realistic goals. Your health care provider can help you to make an activity plan that works for you. Exercise regularly as directed by your health care provider. This may include:  · Doing resistance training twice each week, such as:  ¨ Push-ups.  ¨ Sit-ups.  ¨ Lifting weights.  ¨ Using resistance bands.  · Doing a given intensity of exercise for a given amount of time. Choose from these options:  ¨ 150 minutes of moderate-intensity exercise every week.  ¨ 75 minutes of vigorous-intensity exercise every week.  ¨ A mix of moderate-intensity and vigorous-intensity exercise every week.  Children, pregnant women, people who are out of shape, people who are overweight, and older adults may need to consult a health care provider for individual recommendations. If you have any sort of medical condition, be sure to consult your health care provider before starting a new exercise program.  What are some activities that can help me to lose weight?  · Walking at a rate of at least 4.5 miles an hour.  · Jogging or running at a rate of 5 miles per hour.  · Biking at a rate of at least 10 miles per hour.  · Lap swimming.  · Roller-skating or in-line  skating.  · Cross-country skiing.  · Vigorous competitive sports, such as football, basketball, and soccer.  · Jumping rope.  · Aerobic dancing.  How can I be more active in my day-to-day activities?  · Use the stairs instead of the elevator.  · Take a walk during your lunch break.  · If you drive, park your car farther away from work or school.  · If you take public transportation, get off one stop early and walk the rest of the way.  · Make all of your phone calls while standing up and walking around.  · Get up, stretch, and walk around every 30 minutes throughout the day.  What guidelines should I follow while exercising?  · Do not exercise so much that you hurt yourself, feel dizzy, or get very short of breath.  · Consult your health care provider prior to starting a new exercise program.  · Wear comfortable clothes and shoes with good support.  · Drink plenty of water while you exercise to prevent dehydration or heat stroke. Body water is lost during exercise and must be replaced.  · Work out until you breathe faster and your heart beats faster.  This information is not intended to replace advice given to you by your health care provider. Make sure you discuss any questions you have with your health care provider.  Document Released: 01/20/2012 Document Revised: 05/25/2017 Document Reviewed: 05/21/2015  [employer] Interactive Patient Education © 2017 [employer]

## 2018-05-22 ENCOUNTER — OFFICE VISIT (OUTPATIENT)
Dept: OTOLARYNGOLOGY | Facility: CLINIC | Age: 48
End: 2018-05-22

## 2018-05-22 ENCOUNTER — CLINICAL SUPPORT (OUTPATIENT)
Dept: OTOLARYNGOLOGY | Facility: CLINIC | Age: 48
End: 2018-05-22

## 2018-05-22 VITALS
BODY MASS INDEX: 50.02 KG/M2 | HEART RATE: 80 BPM | SYSTOLIC BLOOD PRESSURE: 150 MMHG | HEIGHT: 64 IN | WEIGHT: 293 LBS | TEMPERATURE: 98 F | DIASTOLIC BLOOD PRESSURE: 90 MMHG

## 2018-05-22 DIAGNOSIS — E04.9 GOITER: ICD-10-CM

## 2018-05-22 DIAGNOSIS — E04.9 GOITER: Primary | ICD-10-CM

## 2018-05-22 DIAGNOSIS — E04.2 MULTINODULAR GOITER: ICD-10-CM

## 2018-05-22 DIAGNOSIS — E05.90 HYPERTHYROIDISM: ICD-10-CM

## 2018-05-22 PROCEDURE — 99203 OFFICE O/P NEW LOW 30 MIN: CPT | Performed by: PHYSICIAN ASSISTANT

## 2018-05-22 PROCEDURE — 76536 US EXAM OF HEAD AND NECK: CPT | Performed by: PHYSICIAN ASSISTANT

## 2018-05-22 NOTE — PATIENT INSTRUCTIONS
Will get thyroid uptake scan and FNA, follow-up in 4-6 weeks to discuss results and treatment options.    ###### BMI  #####   MyPlate from [employer]  The general, healthful diet is based on the 2010 Dietary Guidelines for Americans. The amount of food you need to eat from each food group depends on your age, sex, and level of physical activity and can be individualized by a dietitian. Go to ChooseMyPlate.gov for more information.  What do I need to know about the MyPlate plan?  · Enjoy your food, but eat less.  · Avoid oversized portions.  ¨ ½ of your plate should include fruits and vegetables.  ¨ ¼ of your plate should be grains.  ¨ ¼ of your plate should be protein.  Grains   · Make at least half of your grains whole grains.  · For a 2,000 calorie daily food plan, eat 6 oz every day.  · 1 oz is about 1 slice bread, 1 cup cereal, or ½ cup cooked rice, cereal, or pasta.  Vegetables   · Make half your plate fruits and vegetables.  · For a 2,000 calorie daily food plan, eat 2½ cups every day.  · 1 cup is about 1 cup raw or cooked vegetables or vegetable juice or 2 cups raw leafy greens.  Fruits   · Make half your plate fruits and vegetables.  · For a 2,000 calorie daily food plan, eat 2 cups every day.  · 1 cup is about 1 cup fruit or 100% fruit juice or ½ cup dried fruit.  Protein   · For a 2,000 calorie daily food plan, eat 5½ oz every day.  · 1 oz is about 1 oz meat, poultry, or fish, ¼ cup cooked beans, 1 egg, 1 Tbsp peanut butter, or ½ oz nuts or seeds.  Dairy   · Switch to fat-free or low-fat (1%) milk.  · For a 2,000 calorie daily food plan, eat 3 cups every day.  · 1 cup is about 1 cup milk or yogurt or soy milk (soy beverage), 1½ oz natural cheese, or 2 oz processed cheese.  Fats, Oils, and Empty Calories   · Only small amounts of oils are recommended.  · Empty calories are calories from solid fats or added sugars.  · Compare sodium in foods like soup, bread, and frozen meals. Choose the foods with lower  numbers.  · Drink water instead of sugary drinks.  What foods can I eat?  Grains   Whole grains such as whole wheat, quinoa, millet, and bulgur. Bread, rolls, and pasta made from whole grains. Brown or wild rice. Hot or cold cereals made from whole grains and without added sugar.  Vegetables   All fresh vegetables, especially fresh red, dark green, or orange vegetables. Peas and beans. Low-sodium frozen or canned vegetables prepared without added salt. Low-sodium vegetable juices.  Fruits   All fresh, frozen, and dried fruits. Canned fruit packed in water or fruit juice without added sugar. Fruit juices without added sugar.  Meats and Other Protein Sources   Boiled, baked, or grilled lean meat trimmed of fat. Skinless poultry. Fresh seafood and shellfish. Canned seafood packed in water. Unsalted nuts and unsalted nut butters. Tofu. Dried beans and pea. Eggs.  Dairy   Low-fat or fat-free milk, yogurt, and cheeses.  Sweets and Desserts   Frozen desserts made from low-fat milk.  Fats and Oils   Olive, peanut, and canola oils and margarine. Salad dressing and mayonnaise made from these oils.  Other   Soups and casseroles made from allowed ingredients and without added fat or salt.  The items listed above may not be a complete list of recommended foods or beverages. Contact your dietitian for more options.   What foods are not recommended?  Grains   Sweetened, low-fiber cereals. Packaged baked goods. Snack crackers and chips. Cheese crackers, butter crackers, and biscuits. Frozen waffles, sweet breads, doughnuts, pastries, packaged baking mixes, pancakes, cakes, and cookies.  Vegetables   Regular canned or frozen vegetables or vegetables prepared with salt. Canned tomatoes. Canned tomato sauce. Fried vegetables. Vegetables in cream sauce or cheese sauce.  Fruits   Fruits packed in syrup or made with added sugar.  Meats and Other Protein Sources   Marbled or fatty meats such as ribs. Poultry with skin. Fried meats,  poultry, eggs, or fish. Sausages, hot dogs, and deli meats such as pastrami, bologna, or salami.  Dairy   Whole milk, cream, cheeses made from whole milk, sour cream. Ice cream or yogurt made from whole milk or with added sugar.  Beverages   For adults, no more than one alcoholic drink per day. Regular soft drinks or other sugary beverages. Juice drinks.  Sweets and Desserts   Sugary or fatty desserts, candy, and other sweets.  Fats and Oils   Solid shortening or partially hydrogenated oils. Solid margarine. Margarine that contains trans fats. Butter.  The items listed above may not be a complete list of foods and beverages to avoid. Contact your dietitian for more information.   This information is not intended to replace advice given to you by your health care provider. Make sure you discuss any questions you have with your health care provider.  Document Released: 01/06/2009 Document Revised: 05/25/2017 Document Reviewed: 11/26/2014  [employer] Interactive Patient Education © 2017 [employer]     Calorie Counting for Weight Loss  Calories are units of energy. Your body needs a certain amount of calories from food to keep you going throughout the day. When you eat more calories than your body needs, your body stores the extra calories as fat. When you eat fewer calories than your body needs, your body burns fat to get the energy it needs.  Calorie counting means keeping track of how many calories you eat and drink each day. Calorie counting can be helpful if you need to lose weight. If you make sure to eat fewer calories than your body needs, you should lose weight. Ask your health care provider what a healthy weight is for you.  For calorie counting to work, you will need to eat the right number of calories in a day in order to lose a healthy amount of weight per week. A dietitian can help you determine how many calories you need in a day and will give you suggestions on how to reach your calorie goal.  · A  healthy amount of weight to lose per week is usually 1-2 lb (0.5-0.9 kg). This usually means that your daily calorie intake should be reduced by 500-750 calories.  · Eating 1,200 - 1,500 calories per day can help most women lose weight.  · Eating 1,500 - 1,800 calories per day can help most men lose weight.  What is my plan?  My goal is to have __________ calories per day.  If I have this many calories per day, I should lose around __________ pounds per week.  What do I need to know about calorie counting?  In order to meet your daily calorie goal, you will need to:  · Find out how many calories are in each food you would like to eat. Try to do this before you eat.  · Decide how much of the food you plan to eat.  · Write down what you ate and how many calories it had. Doing this is called keeping a food log.  To successfully lose weight, it is important to balance calorie counting with a healthy lifestyle that includes regular activity. Aim for 150 minutes of moderate exercise (such as walking) or 75 minutes of vigorous exercise (such as running) each week.  Where do I find calorie information?     The number of calories in a food can be found on a Nutrition Facts label. If a food does not have a Nutrition Facts label, try to look up the calories online or ask your dietitian for help.  Remember that calories are listed per serving. If you choose to have more than one serving of a food, you will have to multiply the calories per serving by the amount of servings you plan to eat. For example, the label on a package of bread might say that a serving size is 1 slice and that there are 90 calories in a serving. If you eat 1 slice, you will have eaten 90 calories. If you eat 2 slices, you will have eaten 180 calories.  How do I keep a food log?  Immediately after each meal, record the following information in your food log:  · What you ate. Don't forget to include toppings, sauces, and other extras on the food.  · How  "much you ate. This can be measured in cups, ounces, or number of items.  · How many calories each food and drink had.  · The total number of calories in the meal.  Keep your food log near you, such as in a small notebook in your pocket, or use a mobile papo or website. Some programs will calculate calories for you and show you how many calories you have left for the day to meet your goal.  What are some calorie counting tips?  · Use your calories on foods and drinks that will fill you up and not leave you hungry:  ¨ Some examples of foods that fill you up are nuts and nut butters, vegetables, lean proteins, and high-fiber foods like whole grains. High-fiber foods are foods with more than 5 g fiber per serving.  ¨ Drinks such as sodas, specialty coffee drinks, alcohol, and juices have a lot of calories, yet do not fill you up.  · Eat nutritious foods and avoid empty calories. Empty calories are calories you get from foods or beverages that do not have many vitamins or protein, such as candy, sweets, and soda. It is better to have a nutritious high-calorie food (such as an avocado) than a food with few nutrients (such as a bag of chips).  · Know how many calories are in the foods you eat most often. This will help you calculate calorie counts faster.  · Pay attention to calories in drinks. Low-calorie drinks include water and unsweetened drinks.  · Pay attention to nutrition labels for \"low fat\" or \"fat free\" foods. These foods sometimes have the same amount of calories or more calories than the full fat versions. They also often have added sugar, starch, or salt, to make up for flavor that was removed with the fat.  · Find a way of tracking calories that works for you. Get creative. Try different apps or programs if writing down calories does not work for you.  What are some portion control tips?  · Know how many calories are in a serving. This will help you know how many servings of a certain food you can " have.  · Use a measuring cup to measure serving sizes. You could also try weighing out portions on a kitchen scale. With time, you will be able to estimate serving sizes for some foods.  · Take some time to put servings of different foods on your favorite plates, bowls, and cups so you know what a serving looks like.  · Try not to eat straight from a bag or box. Doing this can lead to overeating. Put the amount you would like to eat in a cup or on a plate to make sure you are eating the right portion.  · Use smaller plates, glasses, and bowls to prevent overeating.  · Try not to multitask (for example, watch TV or use your computer) while eating. If it is time to eat, sit down at a table and enjoy your food. This will help you to know when you are full. It will also help you to be aware of what you are eating and how much you are eating.  What are tips for following this plan?  Reading food labels   · Check the calorie count compared to the serving size. The serving size may be smaller than what you are used to eating.  · Check the source of the calories. Make sure the food you are eating is high in vitamins and protein and low in saturated and trans fats.  Shopping   · Read nutrition labels while you shop. This will help you make healthy decisions before you decide to purchase your food.  · Make a grocery list and stick to it.  Cooking   · Try to cook your favorite foods in a healthier way. For example, try baking instead of frying.  · Use low-fat dairy products.  Meal planning   · Use more fruits and vegetables. Half of your plate should be fruits and vegetables.  · Include lean proteins like poultry and fish.  How do I count calories when eating out?  · Ask for smaller portion sizes.  · Consider sharing an entree and sides instead of getting your own entree.  · If you get your own entree, eat only half. Ask for a box at the beginning of your meal and put the rest of your entree in it so you are not tempted to eat  it.  · If calories are listed on the menu, choose the lower calorie options.  · Choose dishes that include vegetables, fruits, whole grains, low-fat dairy products, and lean protein.  · Choose items that are boiled, broiled, grilled, or steamed. Stay away from items that are buttered, battered, fried, or served with cream sauce. Items labeled “crispy” are usually fried, unless stated otherwise.  · Choose water, low-fat milk, unsweetened iced tea, or other drinks without added sugar. If you want an alcoholic beverage, choose a lower calorie option such as a glass of wine or light beer.  · Ask for dressings, sauces, and syrups on the side. These are usually high in calories, so you should limit the amount you eat.  · If you want a salad, choose a garden salad and ask for grilled meats. Avoid extra toppings like dunbar, cheese, or fried items. Ask for the dressing on the side, or ask for olive oil and vinegar or lemon to use as dressing.  · Estimate how many servings of a food you are given. For example, a serving of cooked rice is ½ cup or about the size of half a baseball. Knowing serving sizes will help you be aware of how much food you are eating at restaurants. The list below tells you how big or small some common portion sizes are based on everyday objects:  ¨ 1 oz--4 stacked dice.  ¨ 3 oz--1 deck of cards.  ¨ 1 tsp--1 die.  ¨ 1 Tbsp--½ a ping-pong ball.  ¨ 2 Tbsp--1 ping-pong ball.  ¨ ½ cup--½ baseball.  ¨ 1 cup--1 baseball.  Summary  · Calorie counting means keeping track of how many calories you eat and drink each day. If you eat fewer calories than your body needs, you should lose weight.  · A healthy amount of weight to lose per week is usually 1-2 lb (0.5-0.9 kg). This usually means reducing your daily calorie intake by 500-750 calories.  · The number of calories in a food can be found on a Nutrition Facts label. If a food does not have a Nutrition Facts label, try to look up the calories online or ask your  dietitian for help.  · Use your calories on foods and drinks that will fill you up, and not on foods and drinks that will leave you hungry.  · Use smaller plates, glasses, and bowls to prevent overeating.  This information is not intended to replace advice given to you by your health care provider. Make sure you discuss any questions you have with your health care provider.  Document Released: 12/18/2006 Document Revised: 11/17/2017 Document Reviewed: 11/17/2017  [employer] Interactive Patient Education © 2017 [employer]     Exercising to Lose Weight  Exercising can help you to lose weight. In order to lose weight through exercise, you need to do vigorous-intensity exercise. You can tell that you are exercising with vigorous intensity if you are breathing very hard and fast and cannot hold a conversation while exercising.  Moderate-intensity exercise helps to maintain your current weight. You can tell that you are exercising at a moderate level if you have a higher heart rate and faster breathing, but you are still able to hold a conversation.  How often should I exercise?  Choose an activity that you enjoy and set realistic goals. Your health care provider can help you to make an activity plan that works for you. Exercise regularly as directed by your health care provider. This may include:  · Doing resistance training twice each week, such as:  ¨ Push-ups.  ¨ Sit-ups.  ¨ Lifting weights.  ¨ Using resistance bands.  · Doing a given intensity of exercise for a given amount of time. Choose from these options:  ¨ 150 minutes of moderate-intensity exercise every week.  ¨ 75 minutes of vigorous-intensity exercise every week.  ¨ A mix of moderate-intensity and vigorous-intensity exercise every week.  Children, pregnant women, people who are out of shape, people who are overweight, and older adults may need to consult a health care provider for individual recommendations. If you have any sort of medical condition, be  sure to consult your health care provider before starting a new exercise program.  What are some activities that can help me to lose weight?  · Walking at a rate of at least 4.5 miles an hour.  · Jogging or running at a rate of 5 miles per hour.  · Biking at a rate of at least 10 miles per hour.  · Lap swimming.  · Roller-skating or in-line skating.  · Cross-country skiing.  · Vigorous competitive sports, such as football, basketball, and soccer.  · Jumping rope.  · Aerobic dancing.  How can I be more active in my day-to-day activities?  · Use the stairs instead of the elevator.  · Take a walk during your lunch break.  · If you drive, park your car farther away from work or school.  · If you take public transportation, get off one stop early and walk the rest of the way.  · Make all of your phone calls while standing up and walking around.  · Get up, stretch, and walk around every 30 minutes throughout the day.  What guidelines should I follow while exercising?  · Do not exercise so much that you hurt yourself, feel dizzy, or get very short of breath.  · Consult your health care provider prior to starting a new exercise program.  · Wear comfortable clothes and shoes with good support.  · Drink plenty of water while you exercise to prevent dehydration or heat stroke. Body water is lost during exercise and must be replaced.  · Work out until you breathe faster and your heart beats faster.  This information is not intended to replace advice given to you by your health care provider. Make sure you discuss any questions you have with your health care provider.  Document Released: 01/20/2012 Document Revised: 05/25/2017 Document Reviewed: 05/21/2015  [employer] Interactive Patient Education © 2017 [employer]

## 2018-05-25 ENCOUNTER — TELEPHONE (OUTPATIENT)
Dept: OTOLARYNGOLOGY | Facility: CLINIC | Age: 48
End: 2018-05-25

## 2018-05-25 DIAGNOSIS — E05.90 HYPERTHYROIDISM: Primary | ICD-10-CM

## 2018-05-25 NOTE — TELEPHONE ENCOUNTER
----- Message from EVA Malik sent at 5/25/2018  7:37 AM CDT -----  Regarding: labs  Call patient and let her know her insurance wants additional laboratory work before an uptake scan can be done. Need her to go to the outpatient laboratory and get this done, will call patient with results.

## 2018-05-29 ENCOUNTER — APPOINTMENT (OUTPATIENT)
Dept: LAB | Facility: HOSPITAL | Age: 48
End: 2018-05-29

## 2018-05-29 LAB
T3FREE SERPL-MCNC: 3.37 PG/ML (ref 2.77–5.27)
T4 FREE SERPL-MCNC: 1.22 NG/DL (ref 0.78–2.19)
TSH SERPL DL<=0.05 MIU/L-ACNC: 0.3 MIU/ML (ref 0.47–4.68)

## 2018-05-29 PROCEDURE — 84443 ASSAY THYROID STIM HORMONE: CPT | Performed by: PHYSICIAN ASSISTANT

## 2018-05-29 PROCEDURE — 84481 FREE ASSAY (FT-3): CPT | Performed by: PHYSICIAN ASSISTANT

## 2018-05-29 PROCEDURE — 86376 MICROSOMAL ANTIBODY EACH: CPT | Performed by: PHYSICIAN ASSISTANT

## 2018-05-29 PROCEDURE — 36415 COLL VENOUS BLD VENIPUNCTURE: CPT | Performed by: PHYSICIAN ASSISTANT

## 2018-05-29 PROCEDURE — 84439 ASSAY OF FREE THYROXINE: CPT | Performed by: PHYSICIAN ASSISTANT

## 2018-05-29 PROCEDURE — 84445 ASSAY OF TSI GLOBULIN: CPT | Performed by: PHYSICIAN ASSISTANT

## 2018-05-30 LAB — THYROPEROXIDASE AB SERPL-ACNC: 6 IU/ML (ref 0–34)

## 2018-05-31 LAB — TSI SER-MCNC: <0.1 IU/L (ref 0–0.55)

## 2018-06-04 ENCOUNTER — APPOINTMENT (OUTPATIENT)
Dept: LAB | Facility: HOSPITAL | Age: 48
End: 2018-06-04
Attending: INTERNAL MEDICINE

## 2018-06-04 ENCOUNTER — TRANSCRIBE ORDERS (OUTPATIENT)
Dept: LAB | Facility: HOSPITAL | Age: 48
End: 2018-06-04

## 2018-06-04 DIAGNOSIS — D47.2 MONOCLONAL PARAPROTEINEMIA: Primary | ICD-10-CM

## 2018-06-04 PROCEDURE — 84156 ASSAY OF PROTEIN URINE: CPT | Performed by: INTERNAL MEDICINE

## 2018-06-04 PROCEDURE — 84166 PROTEIN E-PHORESIS/URINE/CSF: CPT | Performed by: INTERNAL MEDICINE

## 2018-06-04 PROCEDURE — 83883 ASSAY NEPHELOMETRY NOT SPEC: CPT | Performed by: INTERNAL MEDICINE

## 2018-06-04 PROCEDURE — 86334 IMMUNOFIX E-PHORESIS SERUM: CPT | Performed by: INTERNAL MEDICINE

## 2018-06-04 PROCEDURE — 86335 IMMUNFIX E-PHORSIS/URINE/CSF: CPT | Performed by: INTERNAL MEDICINE

## 2018-06-04 PROCEDURE — 82784 ASSAY IGA/IGD/IGG/IGM EACH: CPT | Performed by: INTERNAL MEDICINE

## 2018-06-04 PROCEDURE — 81050 URINALYSIS VOLUME MEASURE: CPT | Performed by: INTERNAL MEDICINE

## 2018-06-04 PROCEDURE — 84165 PROTEIN E-PHORESIS SERUM: CPT | Performed by: INTERNAL MEDICINE

## 2018-06-04 PROCEDURE — 84155 ASSAY OF PROTEIN SERUM: CPT | Performed by: INTERNAL MEDICINE

## 2018-06-04 PROCEDURE — 36415 COLL VENOUS BLD VENIPUNCTURE: CPT

## 2018-06-05 ENCOUNTER — APPOINTMENT (OUTPATIENT)
Dept: NUCLEAR MEDICINE | Facility: HOSPITAL | Age: 48
End: 2018-06-05

## 2018-06-05 LAB
ALBUMIN SERPL-MCNC: 3.2 G/DL (ref 2.9–4.4)
ALBUMIN/GLOB SERPL: 0.8 {RATIO} (ref 0.7–1.7)
ALPHA1 GLOB FLD ELPH-MCNC: 0.3 G/DL (ref 0–0.4)
ALPHA2 GLOB SERPL ELPH-MCNC: 0.8 G/DL (ref 0.4–1)
B-GLOBULIN SERPL ELPH-MCNC: 1.1 G/DL (ref 0.7–1.3)
GAMMA GLOB SERPL ELPH-MCNC: 1.6 G/DL (ref 0.4–1.8)
GLOBULIN SER CALC-MCNC: 3.8 G/DL (ref 2.2–3.9)
IGA SERPL-MCNC: 80 MG/DL (ref 87–352)
IGG SERPL-MCNC: 1701 MG/DL (ref 700–1600)
IGM SERPL-MCNC: 41 MG/DL (ref 26–217)
Lab: ABNORMAL
M-SPIKE: 1.3 G/DL
PROT PATTERN SERPL ELPH-IMP: ABNORMAL
PROT SERPL-MCNC: 7 G/DL (ref 6–8.5)

## 2018-06-06 ENCOUNTER — APPOINTMENT (OUTPATIENT)
Dept: NUCLEAR MEDICINE | Facility: HOSPITAL | Age: 48
End: 2018-06-06

## 2018-06-06 ENCOUNTER — TELEPHONE (OUTPATIENT)
Dept: OTOLARYNGOLOGY | Facility: CLINIC | Age: 48
End: 2018-06-06

## 2018-06-06 LAB
ALBUMIN 24H MFR UR ELPH: 14.2 %
ALBUMIN SERPL-MCNC: 3.3 G/DL (ref 2.9–4.4)
ALBUMIN/GLOB SERPL: 0.9 {RATIO} (ref 0.7–1.7)
ALPHA1 GLOB 24H MFR UR ELPH: 2.5 %
ALPHA1 GLOB FLD ELPH-MCNC: 0.3 G/DL (ref 0–0.4)
ALPHA2 GLOB 24H MFR UR ELPH: 12.8 %
ALPHA2 GLOB SERPL ELPH-MCNC: 0.9 G/DL (ref 0.4–1)
B-GLOBULIN MFR UR ELPH: 58.1 %
B-GLOBULIN SERPL ELPH-MCNC: 1.1 G/DL (ref 0.7–1.3)
GAMMA GLOB 24H MFR UR ELPH: 12.4 %
GAMMA GLOB SERPL ELPH-MCNC: 1.7 G/DL (ref 0.4–1.8)
GLOBULIN SER CALC-MCNC: 3.9 G/DL (ref 2.2–3.9)
HIV 1 & 2 AB SER-IMP: ABNORMAL
IGA SERPL-MCNC: 72 MG/DL (ref 87–352)
IGG SERPL-MCNC: 1739 MG/DL (ref 700–1600)
IGM SERPL-MCNC: 43 MG/DL (ref 26–217)
INTERPRETATION SERPL IEP-IMP: ABNORMAL
INTERPRETATION UR IFE-IMP: ABNORMAL
Lab: ABNORMAL
M PROTEIN 24H MFR UR ELPH: ABNORMAL %
M-SPIKE: ABNORMAL G/DL
PROT 24H UR-MRATE: 156 MG/24 HR (ref 30–150)
PROT SERPL-MCNC: 7.2 G/DL (ref 6–8.5)
PROT UR-MCNC: 12 MG/DL

## 2018-06-06 NOTE — TELEPHONE ENCOUNTER
Dr. Velazquez has reviewed patient's endocrinology records. He states she needs to have I123 scan and if normal needs to follow up with [place]. I have notified Ashley with Dr. Lindo.

## 2018-06-07 DIAGNOSIS — E04.9 GOITER: Primary | ICD-10-CM

## 2018-06-07 LAB — SPECIMEN STATUS: NORMAL

## 2018-06-08 LAB
KAPPA LC SERPL-MCNC: 7.6 MG/L (ref 3.3–19.4)
KAPPA LC/LAMBDA SER: 0.1 {RATIO} (ref 0.26–1.65)
LAMBDA LC FREE SERPL-MCNC: 78.6 MG/L (ref 5.7–26.3)

## 2018-06-11 ENCOUNTER — APPOINTMENT (OUTPATIENT)
Dept: ULTRASOUND IMAGING | Facility: HOSPITAL | Age: 48
End: 2018-06-11

## 2018-06-12 ENCOUNTER — HOSPITAL ENCOUNTER (OUTPATIENT)
Dept: ULTRASOUND IMAGING | Facility: HOSPITAL | Age: 48
Discharge: HOME | End: 2018-06-12
Admitting: PHYSICIAN ASSISTANT

## 2018-06-12 VITALS — DIASTOLIC BLOOD PRESSURE: 74 MMHG | SYSTOLIC BLOOD PRESSURE: 136 MMHG

## 2018-06-12 DIAGNOSIS — E05.90 HYPERTHYROIDISM: ICD-10-CM

## 2018-06-12 DIAGNOSIS — E04.9 GOITER: ICD-10-CM

## 2018-06-12 DIAGNOSIS — E04.2 MULTINODULAR GOITER: ICD-10-CM

## 2018-06-12 PROCEDURE — 88334 PATH CONSLTJ SURG CYTO XM EA: CPT | Performed by: PHYSICIAN ASSISTANT

## 2018-06-12 PROCEDURE — 88162 CYTOPATH SMEAR OTHER SOURCE: CPT | Performed by: PHYSICIAN ASSISTANT

## 2018-06-12 PROCEDURE — 76942 ECHO GUIDE FOR BIOPSY: CPT

## 2018-06-12 PROCEDURE — 88172 CYTP DX EVAL FNA 1ST EA SITE: CPT | Performed by: PHYSICIAN ASSISTANT

## 2018-06-12 PROCEDURE — 88177 CYTP FNA EVAL EA ADDL: CPT | Performed by: PHYSICIAN ASSISTANT

## 2018-06-12 PROCEDURE — 88173 CYTOPATH EVAL FNA REPORT: CPT | Performed by: PHYSICIAN ASSISTANT

## 2018-06-14 LAB
CYTO UR: NORMAL
LAB AP CASE REPORT: NORMAL
LAB AP CLINICAL INFORMATION: NORMAL
Lab: NORMAL
Lab: NORMAL
PATH REPORT.FINAL DX SPEC: NORMAL
PATH REPORT.GROSS SPEC: NORMAL

## 2018-06-15 ENCOUNTER — TELEPHONE (OUTPATIENT)
Dept: OTOLARYNGOLOGY | Facility: CLINIC | Age: 48
End: 2018-06-15

## 2018-06-15 NOTE — TELEPHONE ENCOUNTER
----- Message from EVA Malik sent at 6/14/2018 12:54 PM CDT -----  Please call the patient regarding her abnormal result. nondiagnostic

## 2018-06-15 NOTE — TELEPHONE ENCOUNTER
Called and spoke with patient, gave her results of FNA, abnormal, nondiagnostic. Patient was upset about results, she will contact [facility] about the test.

## 2018-06-25 ENCOUNTER — TRANSCRIBE ORDERS (OUTPATIENT)
Dept: ADMINISTRATIVE | Facility: HOSPITAL | Age: 48
End: 2018-06-25

## 2018-06-25 DIAGNOSIS — D47.2 SMOLDERING MYELOMA: Primary | ICD-10-CM

## 2018-06-26 ENCOUNTER — HOSPITAL ENCOUNTER (OUTPATIENT)
Dept: NUCLEAR MEDICINE | Facility: HOSPITAL | Age: 48
Discharge: HOME | End: 2018-06-26

## 2018-06-26 PROCEDURE — 0 SODIUM IODIDE 3.7 MBQ CAPSULE: Performed by: OTOLARYNGOLOGY

## 2018-06-26 PROCEDURE — A9516 IODINE I-123 SOD IODIDE MIC: HCPCS | Performed by: OTOLARYNGOLOGY

## 2018-06-26 RX ORDER — SODIUM IODIDE I 123 100 UCI/1
1 CAPSULE, GELATIN COATED ORAL
Status: COMPLETED | OUTPATIENT
Start: 2018-06-26 | End: 2018-06-26

## 2018-06-26 RX ADMIN — SODIUM IODIDE I 123 1 CAPSULE: 100 CAPSULE, GELATIN COATED ORAL at 07:00

## 2018-06-27 ENCOUNTER — HOSPITAL ENCOUNTER (OUTPATIENT)
Dept: NUCLEAR MEDICINE | Facility: HOSPITAL | Age: 48
Discharge: HOME | End: 2018-06-27

## 2018-06-27 PROCEDURE — 78014 THYROID IMAGING W/BLOOD FLOW: CPT

## 2018-06-29 ENCOUNTER — TELEPHONE (OUTPATIENT)
Dept: OTOLARYNGOLOGY | Facility: CLINIC | Age: 48
End: 2018-06-29

## 2018-06-29 ENCOUNTER — APPOINTMENT (OUTPATIENT)
Dept: CT IMAGING | Facility: HOSPITAL | Age: 48
End: 2018-06-29
Attending: INTERNAL MEDICINE

## 2018-06-29 NOTE — TELEPHONE ENCOUNTER
Called and left message on patient's voicemail, nuclear scan was normal. To keep follow up appointment.

## 2018-06-29 NOTE — TELEPHONE ENCOUNTER
----- Message from EVA Malik sent at 6/28/2018  8:57 AM CDT -----  Please call the patient regarding her normal result.

## 2018-07-02 ENCOUNTER — APPOINTMENT (OUTPATIENT)
Dept: NUCLEAR MEDICINE | Facility: HOSPITAL | Age: 48
End: 2018-07-02

## 2018-07-02 ENCOUNTER — HOSPITAL ENCOUNTER (OUTPATIENT)
Dept: CT IMAGING | Facility: HOSPITAL | Age: 48
End: 2018-07-02
Attending: INTERNAL MEDICINE

## 2018-07-02 ENCOUNTER — APPOINTMENT (OUTPATIENT)
Dept: CT IMAGING | Facility: HOSPITAL | Age: 48
End: 2018-07-02
Attending: INTERNAL MEDICINE

## 2018-07-03 ENCOUNTER — APPOINTMENT (OUTPATIENT)
Dept: NUCLEAR MEDICINE | Facility: HOSPITAL | Age: 48
End: 2018-07-03

## 2018-07-09 ENCOUNTER — HOSPITAL ENCOUNTER (OUTPATIENT)
Dept: CT IMAGING | Facility: HOSPITAL | Age: 48
Discharge: HOME | End: 2018-07-09
Attending: INTERNAL MEDICINE | Admitting: INTERNAL MEDICINE

## 2018-07-09 ENCOUNTER — HOSPITAL ENCOUNTER (OUTPATIENT)
Dept: CT IMAGING | Facility: HOSPITAL | Age: 48
Discharge: HOME | End: 2018-07-09
Attending: INTERNAL MEDICINE

## 2018-07-09 DIAGNOSIS — D47.2 SMOLDERING MYELOMA: ICD-10-CM

## 2018-07-09 PROCEDURE — A9552 F18 FDG: HCPCS | Performed by: INTERNAL MEDICINE

## 2018-07-09 PROCEDURE — 78816 PET IMAGE W/CT FULL BODY: CPT

## 2018-07-09 PROCEDURE — 0 FLUDEOXYGLUCOSE F18 SOLUTION: Performed by: INTERNAL MEDICINE

## 2018-07-09 RX ADMIN — FLUDEOXYGLUCOSE F18 1 DOSE: 300 INJECTION INTRAVENOUS at 09:00

## 2018-07-09 NOTE — PROGRESS NOTES
YOB: 1970  Location: [facility]  Location Address: [address] [place] [address],  [address], [address] [place] [address]  Location Phone: [phone]    Chief Complaint   Patient presents with   • Follow-up     FNA follow up       History of Present Illness  Arminda Mccloud is a 48 y.o. female.  Arminda Mccloud is here for follow-up evaluation of ENT complaints. The patient has had problems with hyperthyroidism, dysphagia and neck tightness and history of SVT. The symptoms are not localized to a particular location. The patient has had moderate symptoms. The symptoms have been present for the last 15 months. The symptoms are aggravated by  no identifiable factors. The symptoms are improved by no identifiable factors. Patient has had a thyroid uptake scan, thyroid laboratory work, and an FNA that showed normal uptake, decreased TSH, and non-diagnostic FNA. Past parathyroid scan was normal with normal calcium and nomogram is normal.    The patient had a previous calcium level of 11 prior to presenting to [facility].  Lengthy explanation at the end of that visit dictated by the physician demonstrates reflection of my thinking re parathyroid disease as well.for example she recommended nonoperative treatment for asymptomatic hyperparathyroidism serum calcium less than 1 mg/dL above normal.  Her PTH level has been slightly elevated.  Her TSH has been low. However thyroid scan was within normal limits despite a nodule.  This argues against an autonomous functioning nodule or diffuse hyperthyroidism which may be amenable to ablation.    She reports a history of weight gain despite normal intake.  She also complains of symptoms consistent with anxiety.    She has not seen a GYN physician in many years and this brings up the possibility of PCOS.       Study Result      EXAMINATION: Parathyroid scan 10/17/2017     HISTORY: Elevated parathyroid hormone with low calcium level x5 months.     FINDINGS: Following  intravenous injection of the radiopharmaceutical  planar images were obtained over the neck and upper chest both  immediately and following a delay. SPECT imaging was obtained on the  delayed sequences. There is normal physiologic uptake of the  radiopharmaceutical within the thyroid gland. Washout phase sequencing  demonstrates uptake within the parotid and submandibular glands. No  focal intense areas of abnormal uptake are identified to suggest  parathyroid adenoma.     IMPRESSION:  . Normal parathyroid scan with no evidence of abnormal foci  of activity on the delayed images to suggest a parathyroid adenoma.  This report was finalized on 10/17/2017 14:00 by Dr. Facundo David MD.            3/30/18   Calcium 8.4 - 10.4 mg/dL 9.0       TSH 0.470 - 4.680 mIU/mL 0.143        4/4/18 PTH 71       5/29/18  TSH 0.470 - 4.680 mIU/mL 0.305       Thyroid Peroxidase Antibody 0 - 34 IU/mL 6      Thyroid Stimulating Immunoglobulin 0.00 - 0.55 IU/L <0.10      Free T4 0.78 - 2.19 ng/dL 1.22      T3, Free 2.77 - 5.27 pg/mL 3.37                   Fine Needle Aspiration   Order: [id number]   Status:  Final result   Visible to patient:  No (Not Released)    3wk ago   Final Diagnosis   Left thyroid, fine-needle aspiration:  [place] category I: Nondiagnostic/unsatisfactory.  Obscuring blood, clotting artifact, and scant cellularity.   Electronically signed by Geri Sanches MD on 6/14/2018 at 0953                      Past Medical History:   Diagnosis Date   • Arthritis    • Chronic fatigue    • Depression    • Generalized anxiety disorder    • Goiter 5/22/2018   • Hypercalcemia    • Hyperparathyroidism (CMS/HCC)    • Hypothyroidism    • Irritable bowel syndrome    • Multinodular goiter    • Obesity    • Onychomycosis    • Panic    • Supraventricular tachycardia (CMS/HCC)    • Tendonitis    • Vitamin D deficiency        Past Surgical History:   Procedure Laterality Date   • BONY PELVIS SURGERY     • CHOLECYSTECTOMY     • HAND  SURGERY     • JOINT REPLACEMENT Left     knee   • TONSILLECTOMY AND ADENOIDECTOMY         Outpatient Prescriptions Marked as Taking for the 7/10/18 encounter (Office Visit) with Hal Velazquez MD   Medication Sig Dispense Refill   • DULoxetine (CYMBALTA) 60 MG capsule      • hydrOXYzine (VISTARIL) 50 MG capsule Take 1 capsule by mouth 3 (Three) Times a Day As Needed for Itching. 10 capsule 0   • metoprolol succinate XL (TOPROL-XL) 25 MG 24 hr tablet Take 25 mg by mouth 2 (Two) Times a Day.     • montelukast (SINGULAIR) 10 MG tablet Take 10 mg by mouth Every Night.     • vitamin D (ERGOCALCIFEROL) 69760 units capsule capsule Take 50,000 Units by mouth 1 (One) Time Per Week.         Lamisil [terbinafine hcl] and Buspirone    Family History   Problem Relation Age of Onset   • No Known Problems Mother    • Hypertension Father    • Diabetes Paternal Grandmother    • Breast cancer Paternal Aunt        Social History     Social History   • Marital status: Single     Spouse name: N/A   • Number of children: N/A   • Years of education: N/A     Occupational History   • Not on file.     Social History Main Topics   • Smoking status: Never Smoker   • Smokeless tobacco: Never Used   • Alcohol use No   • Drug use: No   • Sexual activity: Not on file     Other Topics Concern   • Not on file     Social History Narrative   • No narrative on file       Review of Systems  Constitutional: Negative for activity change, appetite change, chills, diaphoresis, fatigue, fever and unexpected weight change.   HENT: Positive for trouble swallowing. Negative for congestion, dental problem, drooling, ear discharge, ear pain, facial swelling, hearing loss, mouth sores, nosebleeds, postnasal drip, rhinorrhea, sinus pressure, sneezing, sore throat, tinnitus and voice change.         Goiter  Hyperthyoidism   Eyes: Negative.    Respiratory: Negative.    Cardiovascular:        SVT   Gastrointestinal: Negative.    Endocrine: Negative.    Skin:  Negative.    Allergic/Immunologic: Negative for environmental allergies, food allergies and immunocompromised state.   Neurological: Negative.    Hematological: Negative.    Psychiatric/Behavioral: Negative.      Vitals:    07/10/18 1117   BP: (!) 152/102   Temp: 98.3 °F (36.8 °C)       Body mass index is 54.58 kg/m².    Objective     Physical Exam  CONSTITUTIONAL: Morbidly obese, alert, oriented, in no acute distress      COMMUNICATION AND VOICE: able to communicate normally, normal voice quality     HEAD: normocephalic, no lesions, atraumatic, no tenderness, no masses      FACE: appearance normal, no lesions, no tenderness, no deformities, facial motion symmetric     SALIVARY GLANDS: parotid glands with no tenderness, no swelling, no masses, submandibular glands with normal size, nontender     EYES: ocular motility normal, eyelids normal, orbits normal, no proptosis, conjunctiva normal , pupils equal, round      EARS:  Hearing: response to conversational voice normal bilaterally   External Ears: auricles without lesions  Otoscopic: tympanic membrane appearance normal, no lesions, no perforation, normal mobility, no fluid     NOSE:  External Nose: structure normal, no tenderness on palpation, no nasal discharge, no lesions, no evidence of trauma, nostrils patent   Intranasal Exam: nasal mucosa normal, vestibule within normal limits, inferior turbinate normal, nasal septum midline   Nasopharynx:      ORAL:  Lips: upper and lower lips without lesion   Teeth: dentition within normal limits for age   Gums: gingivae healthy   Oral Mucosa: oral mucosa normal, no mucosal lesions   Floor of Mouth: Warthin’s duct patent, mucosa normal  Tongue: lingual mucosa normal without lesions, normal tongue mobility   Palate: soft and hard palates with normal mucosa and structure  Oropharynx: oropharyngeal mucosa normal     NECK: neck appearance normal, no mass,  noted without erythema or tenderness     THYROID: mild thyromegaly, no  tenderness, nodule on left lobe felt, but primarily perceived as fullness, position midline      LYMPH NODES: no lymphadenopathy     CHEST/RESPIRATORY: respiratory effort normal, normal breath sounds      CARDIOVASCULAR: rate and rhythm normal, extremities without cyanosis or edema       NEUROLOGIC/PSYCHIATRIC: oriented to time, place and person, mood normal, affect appropriate, CN II-XII intact grossly  Assessment/Plan   Problems Addressed this Visit        Endocrine    Multinodular goiter - Primary    Relevant Medications    methIMAzole (TAPAZOLE) 10 MG tablet    Other Relevant Orders    T3    T4    Thyroglobulin With Anti-TG    Thyroid Peroxidase Antibody    TSH    Calcium    PTH, Intact    Hyperthyroidism    Relevant Medications    methIMAzole (TAPAZOLE) 10 MG tablet    Other Relevant Orders    T3    T4    Thyroglobulin With Anti-TG    Thyroid Peroxidase Antibody    TSH    Calcium    PTH, Intact    PCOS (polycystic ovarian syndrome)    Relevant Orders    T3    T4    Thyroglobulin With Anti-TG    Thyroid Peroxidase Antibody    TSH    Calcium    PTH, Intact        * Surgery not found *  Orders Placed This Encounter   Procedures   • T3   • T4   • Thyroglobulin With Anti-TG     Standing Status:   Future     Standing Expiration Date:   7/10/2019   • Thyroid Peroxidase Antibody   • TSH   • Calcium   • PTH, Intact     Return in about 3 months (around 10/10/2018).       Patient Instructions   TSH  Hgb A1C  PTH  Referral to Dr. Poe for ultrasound of the ovaries and GYN exam  Methimazole    Consider referral for ablation of TSH is persistently low    Surgical extirpation via total thyroidectomy as an option if other measures are unsuccessful ameliorating her symptoms    We will continue to monitor calcium and PTH for possible hyperparathyroidism which appears to be quiet some presently

## 2018-07-10 ENCOUNTER — OFFICE VISIT (OUTPATIENT)
Dept: OTOLARYNGOLOGY | Facility: CLINIC | Age: 48
End: 2018-07-10

## 2018-07-10 ENCOUNTER — LAB (OUTPATIENT)
Dept: LAB | Facility: HOSPITAL | Age: 48
End: 2018-07-10

## 2018-07-10 VITALS
SYSTOLIC BLOOD PRESSURE: 152 MMHG | WEIGHT: 293 LBS | BODY MASS INDEX: 50.02 KG/M2 | DIASTOLIC BLOOD PRESSURE: 102 MMHG | HEIGHT: 64 IN | TEMPERATURE: 98.3 F

## 2018-07-10 DIAGNOSIS — E28.2 PCOS (POLYCYSTIC OVARIAN SYNDROME): ICD-10-CM

## 2018-07-10 DIAGNOSIS — E04.2 MULTINODULAR GOITER: ICD-10-CM

## 2018-07-10 DIAGNOSIS — E04.2 MULTINODULAR GOITER: Primary | ICD-10-CM

## 2018-07-10 DIAGNOSIS — E05.90 HYPERTHYROIDISM: ICD-10-CM

## 2018-07-10 LAB
CALCIUM SPEC-SCNC: 9.4 MG/DL (ref 8.4–10.4)
PTH-INTACT SERPL-MCNC: 50.3 PG/ML (ref 7.5–53.5)
TSH SERPL DL<=0.05 MIU/L-ACNC: 0.08 MIU/ML (ref 0.47–4.68)

## 2018-07-10 PROCEDURE — 84432 ASSAY OF THYROGLOBULIN: CPT

## 2018-07-10 PROCEDURE — 86800 THYROGLOBULIN ANTIBODY: CPT | Performed by: OTOLARYNGOLOGY

## 2018-07-10 PROCEDURE — 36415 COLL VENOUS BLD VENIPUNCTURE: CPT | Performed by: OTOLARYNGOLOGY

## 2018-07-10 PROCEDURE — 86376 MICROSOMAL ANTIBODY EACH: CPT | Performed by: OTOLARYNGOLOGY

## 2018-07-10 PROCEDURE — 82310 ASSAY OF CALCIUM: CPT | Performed by: OTOLARYNGOLOGY

## 2018-07-10 PROCEDURE — 84480 ASSAY TRIIODOTHYRONINE (T3): CPT | Performed by: OTOLARYNGOLOGY

## 2018-07-10 PROCEDURE — 84443 ASSAY THYROID STIM HORMONE: CPT | Performed by: OTOLARYNGOLOGY

## 2018-07-10 PROCEDURE — 84436 ASSAY OF TOTAL THYROXINE: CPT | Performed by: OTOLARYNGOLOGY

## 2018-07-10 PROCEDURE — 83970 ASSAY OF PARATHORMONE: CPT | Performed by: OTOLARYNGOLOGY

## 2018-07-10 PROCEDURE — 99214 OFFICE O/P EST MOD 30 MIN: CPT | Performed by: OTOLARYNGOLOGY

## 2018-07-10 RX ORDER — DULOXETIN HYDROCHLORIDE 60 MG/1
CAPSULE, DELAYED RELEASE ORAL
COMMUNITY
Start: 2018-05-30 | End: 2020-03-03

## 2018-07-10 RX ORDER — METHIMAZOLE 10 MG/1
10 TABLET ORAL 3 TIMES DAILY
Qty: 90 TABLET | Refills: 2 | Status: SHIPPED | OUTPATIENT
Start: 2018-07-10 | End: 2018-08-09

## 2018-07-10 NOTE — PATIENT INSTRUCTIONS
TSH  Hgb A1C  PTH  Referral to Dr. Poe for ultrasound of the ovaries and GYN exam  Methimazole    Consider referral for ablation of TSH is persistently low    Surgical extirpation via total thyroidectomy as an option if other measures are unsuccessful ameliorating her symptoms    We will continue to monitor calcium and PTH for possible hyperparathyroidism which appears to be quiet some presently

## 2018-07-11 ENCOUNTER — TELEPHONE (OUTPATIENT)
Dept: OTOLARYNGOLOGY | Facility: CLINIC | Age: 48
End: 2018-07-11

## 2018-07-11 LAB
T3 SERPL-MCNC: 125 NG/DL (ref 71–180)
T4 SERPL-MCNC: 8.3 UG/DL (ref 4.5–12)
THYROGLOB AB SERPL-ACNC: <1 IU/ML (ref 0–0.9)
THYROGLOBULIN BY IMA: 62.7 NG/ML (ref 1.5–38.5)
THYROPEROXIDASE AB SERPL-ACNC: 9 IU/ML (ref 0–34)

## 2018-07-11 NOTE — TELEPHONE ENCOUNTER
Dr. Michael Poe  Obstetrics & gynecology   [address] [place] [address]   [phone]    7/17/18 at 1115    7/12/18 Patient notified

## 2018-07-17 ENCOUNTER — APPOINTMENT (OUTPATIENT)
Dept: NUCLEAR MEDICINE | Facility: HOSPITAL | Age: 48
End: 2018-07-17

## 2018-07-18 ENCOUNTER — APPOINTMENT (OUTPATIENT)
Dept: NUCLEAR MEDICINE | Facility: HOSPITAL | Age: 48
End: 2018-07-18

## 2018-07-19 ENCOUNTER — HOSPITAL ENCOUNTER (EMERGENCY)
Facility: HOSPITAL | Age: 48
Discharge: HOME | End: 2018-07-19
Attending: EMERGENCY MEDICINE | Admitting: EMERGENCY MEDICINE

## 2018-07-19 ENCOUNTER — APPOINTMENT (OUTPATIENT)
Dept: GENERAL RADIOLOGY | Facility: HOSPITAL | Age: 48
End: 2018-07-19

## 2018-07-19 VITALS
RESPIRATION RATE: 15 BRPM | OXYGEN SATURATION: 100 % | TEMPERATURE: 97.6 F | DIASTOLIC BLOOD PRESSURE: 89 MMHG | WEIGHT: 293 LBS | SYSTOLIC BLOOD PRESSURE: 162 MMHG | BODY MASS INDEX: 50.02 KG/M2 | HEIGHT: 64 IN | HEART RATE: 80 BPM

## 2018-07-19 DIAGNOSIS — R79.89 LFT ELEVATION: ICD-10-CM

## 2018-07-19 DIAGNOSIS — R07.9 CHEST PAIN, UNSPECIFIED TYPE: ICD-10-CM

## 2018-07-19 DIAGNOSIS — R73.9 HYPERGLYCEMIA: ICD-10-CM

## 2018-07-19 DIAGNOSIS — D72.829 LEUKOCYTOSIS, UNSPECIFIED TYPE: ICD-10-CM

## 2018-07-19 DIAGNOSIS — I47.1 SVT (SUPRAVENTRICULAR TACHYCARDIA) (HCC): Primary | ICD-10-CM

## 2018-07-19 LAB
ALBUMIN SERPL-MCNC: 4.6 G/DL (ref 3.5–5)
ALBUMIN/GLOB SERPL: 1 G/DL (ref 1.1–2.5)
ALP SERPL-CCNC: 119 U/L (ref 24–120)
ALT SERPL W P-5'-P-CCNC: 138 U/L (ref 0–54)
ANION GAP SERPL CALCULATED.3IONS-SCNC: 17 MMOL/L (ref 4–13)
AST SERPL-CCNC: 126 U/L (ref 7–45)
BASOPHILS # BLD AUTO: 0.04 10*3/MM3 (ref 0–0.2)
BASOPHILS NFR BLD AUTO: 0.3 % (ref 0–2)
BILIRUB SERPL-MCNC: 0.6 MG/DL (ref 0.1–1)
BUN BLD-MCNC: 11 MG/DL (ref 5–21)
BUN/CREAT SERPL: 19.6 (ref 7–25)
CALCIUM SPEC-SCNC: 9.4 MG/DL (ref 8.4–10.4)
CHLORIDE SERPL-SCNC: 98 MMOL/L (ref 98–110)
CO2 SERPL-SCNC: 25 MMOL/L (ref 24–31)
CREAT BLD-MCNC: 0.56 MG/DL (ref 0.5–1.4)
D DIMER PPP FEU-MCNC: <0.22 MG/L (FEU) (ref 0–0.5)
DEPRECATED RDW RBC AUTO: 42.1 FL (ref 40–54)
EOSINOPHIL # BLD AUTO: 0.14 10*3/MM3 (ref 0–0.7)
EOSINOPHIL NFR BLD AUTO: 1 % (ref 0–4)
ERYTHROCYTE [DISTWIDTH] IN BLOOD BY AUTOMATED COUNT: 13.4 % (ref 12–15)
GFR SERPL CREATININE-BSD FRML MDRD: 117 ML/MIN/1.73
GLOBULIN UR ELPH-MCNC: 4.5 GM/DL
GLUCOSE BLD-MCNC: 226 MG/DL (ref 70–100)
HCG SERPL QL: NEGATIVE
HCT VFR BLD AUTO: 43.8 % (ref 37–47)
HGB BLD-MCNC: 14.5 G/DL (ref 12–16)
HOLD SPECIMEN: NORMAL
HOLD SPECIMEN: NORMAL
IMM GRANULOCYTES # BLD: 0.07 10*3/MM3 (ref 0–0.03)
IMM GRANULOCYTES NFR BLD: 0.5 % (ref 0–5)
INR PPP: 0.98 (ref 0.91–1.09)
LYMPHOCYTES # BLD AUTO: 2.65 10*3/MM3 (ref 0.72–4.86)
LYMPHOCYTES NFR BLD AUTO: 19.8 % (ref 15–45)
MAGNESIUM SERPL-MCNC: 1.8 MG/DL (ref 1.4–2.2)
MCH RBC QN AUTO: 28.3 PG (ref 28–32)
MCHC RBC AUTO-ENTMCNC: 33.1 G/DL (ref 33–36)
MCV RBC AUTO: 85.5 FL (ref 82–98)
MONOCYTES # BLD AUTO: 0.9 10*3/MM3 (ref 0.19–1.3)
MONOCYTES NFR BLD AUTO: 6.7 % (ref 4–12)
NEUTROPHILS # BLD AUTO: 9.61 10*3/MM3 (ref 1.87–8.4)
NEUTROPHILS NFR BLD AUTO: 71.7 % (ref 39–78)
NRBC BLD MANUAL-RTO: 0 /100 WBC (ref 0–0)
PLATELET # BLD AUTO: 349 10*3/MM3 (ref 130–400)
PMV BLD AUTO: 10 FL (ref 6–12)
POTASSIUM BLD-SCNC: 3.5 MMOL/L (ref 3.5–5.3)
PROT SERPL-MCNC: 9.1 G/DL (ref 6.3–8.7)
PROTHROMBIN TIME: 13.3 SECONDS (ref 11.9–14.6)
RBC # BLD AUTO: 5.12 10*6/MM3 (ref 4.2–5.4)
SODIUM BLD-SCNC: 140 MMOL/L (ref 135–145)
TROPONIN I SERPL-MCNC: <0.012 NG/ML (ref 0–0.03)
TSH SERPL DL<=0.05 MIU/L-ACNC: 2.15 MIU/ML (ref 0.47–4.68)
WBC NRBC COR # BLD: 13.41 10*3/MM3 (ref 4.8–10.8)
WHOLE BLOOD HOLD SPECIMEN: NORMAL
WHOLE BLOOD HOLD SPECIMEN: NORMAL

## 2018-07-19 PROCEDURE — 99285 EMERGENCY DEPT VISIT HI MDM: CPT

## 2018-07-19 PROCEDURE — 84484 ASSAY OF TROPONIN QUANT: CPT | Performed by: EMERGENCY MEDICINE

## 2018-07-19 PROCEDURE — 71045 X-RAY EXAM CHEST 1 VIEW: CPT

## 2018-07-19 PROCEDURE — 84703 CHORIONIC GONADOTROPIN ASSAY: CPT | Performed by: EMERGENCY MEDICINE

## 2018-07-19 PROCEDURE — 84443 ASSAY THYROID STIM HORMONE: CPT | Performed by: EMERGENCY MEDICINE

## 2018-07-19 PROCEDURE — 80053 COMPREHEN METABOLIC PANEL: CPT | Performed by: EMERGENCY MEDICINE

## 2018-07-19 PROCEDURE — 85610 PROTHROMBIN TIME: CPT | Performed by: EMERGENCY MEDICINE

## 2018-07-19 PROCEDURE — 96361 HYDRATE IV INFUSION ADD-ON: CPT

## 2018-07-19 PROCEDURE — 85379 FIBRIN DEGRADATION QUANT: CPT | Performed by: EMERGENCY MEDICINE

## 2018-07-19 PROCEDURE — 84436 ASSAY OF TOTAL THYROXINE: CPT | Performed by: EMERGENCY MEDICINE

## 2018-07-19 PROCEDURE — 93010 ELECTROCARDIOGRAM REPORT: CPT | Performed by: INTERNAL MEDICINE

## 2018-07-19 PROCEDURE — 93005 ELECTROCARDIOGRAM TRACING: CPT | Performed by: EMERGENCY MEDICINE

## 2018-07-19 PROCEDURE — 85025 COMPLETE CBC W/AUTO DIFF WBC: CPT | Performed by: EMERGENCY MEDICINE

## 2018-07-19 PROCEDURE — 83735 ASSAY OF MAGNESIUM: CPT | Performed by: EMERGENCY MEDICINE

## 2018-07-19 PROCEDURE — 96360 HYDRATION IV INFUSION INIT: CPT

## 2018-07-19 RX ORDER — ASPIRIN 81 MG/1
324 TABLET, CHEWABLE ORAL ONCE
Status: COMPLETED | OUTPATIENT
Start: 2018-07-19 | End: 2018-07-19

## 2018-07-19 RX ORDER — SODIUM CHLORIDE 0.9 % (FLUSH) 0.9 %
10 SYRINGE (ML) INJECTION AS NEEDED
Status: DISCONTINUED | OUTPATIENT
Start: 2018-07-19 | End: 2018-07-19 | Stop reason: HOSPADM

## 2018-07-19 RX ADMIN — ASPIRIN 81 MG 324 MG: 81 TABLET ORAL at 06:44

## 2018-07-19 RX ADMIN — SODIUM CHLORIDE 1000 ML: 9 INJECTION, SOLUTION INTRAVENOUS at 06:46

## 2018-07-19 RX ADMIN — SODIUM CHLORIDE 500 ML: 9 INJECTION, SOLUTION INTRAVENOUS at 06:01

## 2018-07-20 LAB — T4 SERPL-MCNC: 10.6 UG/DL (ref 4.5–12)

## 2018-09-11 LAB
CYTO UR: (no result)
LAB AP CASE REPORT: (no result)
Lab: (no result)
PATH REPORT.FINAL DX SPEC: (no result)
PATH REPORT.GROSS SPEC: (no result)

## 2019-02-12 ENCOUNTER — TRANSCRIBE ORDERS (OUTPATIENT)
Dept: ADMINISTRATIVE | Facility: HOSPITAL | Age: 49
End: 2019-02-12

## 2019-02-12 DIAGNOSIS — J18.9 PNEUMONIA OF LOWER LOBE DUE TO INFECTIOUS ORGANISM, UNSPECIFIED LATERALITY: Primary | ICD-10-CM

## 2019-10-29 ENCOUNTER — HOSPITAL ENCOUNTER (OUTPATIENT)
Dept: MRI IMAGING | Age: 49
Discharge: HOME | End: 2019-10-29
Payer: COMMERCIAL

## 2019-10-29 DIAGNOSIS — H53.9 VISUAL DISTURBANCES: ICD-10-CM

## 2019-10-29 DIAGNOSIS — R51.9 INTRACTABLE HEADACHE, UNSPECIFIED CHRONICITY PATTERN, UNSPECIFIED HEADACHE TYPE: ICD-10-CM

## 2019-10-29 PROCEDURE — 6360000004 HC RX CONTRAST MEDICATION: Performed by: PHYSICIAN ASSISTANT

## 2019-10-29 PROCEDURE — A9577 INJ MULTIHANCE: HCPCS | Performed by: PHYSICIAN ASSISTANT

## 2019-10-29 PROCEDURE — 70553 MRI BRAIN STEM W/O & W/DYE: CPT

## 2019-10-29 RX ADMIN — GADOBENATE DIMEGLUMINE 20 ML: 529 INJECTION, SOLUTION INTRAVENOUS at 10:03

## 2020-03-03 ENCOUNTER — OFFICE VISIT (OUTPATIENT)
Dept: ENDOCRINOLOGY | Facility: CLINIC | Age: 50
End: 2020-03-03

## 2020-03-03 VITALS
WEIGHT: 293 LBS | OXYGEN SATURATION: 94 % | HEART RATE: 108 BPM | BODY MASS INDEX: 50.02 KG/M2 | SYSTOLIC BLOOD PRESSURE: 124 MMHG | HEIGHT: 64 IN | DIASTOLIC BLOOD PRESSURE: 76 MMHG

## 2020-03-03 DIAGNOSIS — E04.2 MULTINODULAR GOITER: Primary | ICD-10-CM

## 2020-03-03 PROBLEM — E04.9 GOITER: Status: RESOLVED | Noted: 2018-05-22 | Resolved: 2020-03-03

## 2020-03-03 PROCEDURE — 99242 OFF/OP CONSLTJ NEW/EST SF 20: CPT | Performed by: INTERNAL MEDICINE

## 2020-03-03 RX ORDER — SITAGLIPTIN AND METFORMIN HYDROCHLORIDE 500; 50 MG/1; MG/1
1 TABLET, FILM COATED ORAL 2 TIMES DAILY WITH MEALS
COMMUNITY
Start: 2020-02-26

## 2020-03-03 RX ORDER — FLUTICASONE PROPIONATE 50 MCG
2 SPRAY, SUSPENSION (ML) NASAL DAILY
COMMUNITY

## 2020-03-03 NOTE — PROGRESS NOTES
Arminda Mccloud is a 50 y.o. female patient that     comes for direct consultation request from Zelalem Lindo MD for my opinion regarding     Chief Complaint   Patient presents with   • Thyroid Problem         49 yo female    Multinodular Goiter    Duration , per review of records since 2018    Location , Patient has bilateral Thryoid Nodules . Thyroid US personally reviewed attached below     Left Inferior 2.9 cm Hypoechoic Nodule   Right  Superior 1 cm Hypoechoic Nodule    Quality, In 2018 , TSH was suppressed and a thyroid uptake was done which had diffuse uptake without cold defects    An FNA of Left Inf nodule was done and nondiagnostic    Timing , suppressed TSH has resolved, last from Nov 2019 is normal     TSI and TPO ab are negative  Thyroid Scan had high nl diffuse uptake   Thyroid Nodules have remained stable between 2018 and 2020     Symptoms, Palpitations / SVT         Past Medical History:   Diagnosis Date   • Arthritis    • Chronic fatigue    • Depression    • Generalized anxiety disorder    • Goiter 5/22/2018   • Hypercalcemia    • Hyperparathyroidism (CMS/HCC)    • Hypothyroidism    • Irritable bowel syndrome    • Multinodular goiter    • Obesity    • Onychomycosis    • Panic    • Supraventricular tachycardia (CMS/HCC)    • Tendonitis    • Vitamin D deficiency      Family History   Problem Relation Age of Onset   • No Known Problems Mother    • Hypertension Father    • Diabetes Paternal Grandmother    • Breast cancer Paternal Aunt      Social History     Tobacco Use   • Smoking status: Never Smoker   • Smokeless tobacco: Never Used   Substance Use Topics   • Alcohol use: No   • Drug use: No         Current Outpatient Medications:   •  fluticasone (FLONASE) 50 MCG/ACT nasal spray, 2 sprays into the nostril(s) as directed by provider Daily., Disp: , Rfl:   •  JANUMET  MG per tablet, , Disp: , Rfl:   •  metoprolol succinate XL (TOPROL-XL) 25 MG 24 hr tablet, Take 25 mg by mouth 2 (Two)  Times a Day., Disp: , Rfl:     Review of Systems obtained from Patient Questionnaire     Review of Systems   Constitutional: Positive for diaphoresis, fatigue and unexpected weight change. Negative for activity change, appetite change, chills and fever.   HENT: Positive for trouble swallowing and voice change. Negative for congestion, dental problem, drooling, ear discharge, ear pain, facial swelling, mouth sores, postnasal drip, rhinorrhea, sinus pressure, sore throat and tinnitus.    Eyes: Positive for visual disturbance. Negative for photophobia, pain, discharge, redness and itching.   Respiratory: Positive for cough, shortness of breath and wheezing. Negative for apnea, choking, chest tightness and stridor.    Cardiovascular: Positive for palpitations. Negative for chest pain and leg swelling.   Gastrointestinal: Negative for abdominal distention, abdominal pain, constipation, diarrhea, nausea and vomiting.   Endocrine: Positive for heat intolerance. Negative for cold intolerance, polydipsia, polyphagia and polyuria.   Genitourinary: Negative for decreased urine volume, difficulty urinating, dysuria, flank pain, frequency, hematuria and urgency.   Musculoskeletal: Positive for arthralgias and myalgias. Negative for back pain, gait problem, joint swelling, neck pain and neck stiffness.   Skin: Negative for color change, pallor, rash and wound.   Allergic/Immunologic: Negative for immunocompromised state.   Neurological: Positive for weakness. Negative for dizziness, tremors, seizures, syncope, facial asymmetry, speech difficulty, light-headedness, numbness and headaches.   Hematological: Negative for adenopathy.   Psychiatric/Behavioral: Positive for decreased concentration. Negative for agitation, behavioral problems, confusion, dysphoric mood, hallucinations, self-injury, sleep disturbance and suicidal ideas. The patient is nervous/anxious. The patient is not hyperactive.         Objective:   /76   Pulse  "108   Ht 162.6 cm (64\")   Wt (!) 137 kg (302 lb 4.8 oz)   SpO2 94%   BMI 51.89 kg/m²     Limited physical exam as patient stated it was a waste of time to have come and hence limited as below :    Physical Exam   Constitutional: No distress.   HENT:   Head: Normocephalic.   Right Ear: External ear normal.   Left Ear: External ear normal.   Nose: Nose normal.   Cardiovascular:   tachycardic   Neurological: She is alert.   Psychiatric: Thought content normal.       Lab Review                  Assessment/Plan       ICD-10-CM ICD-9-CM   1. Multinodular goiter E04.2 241.1         I reviewed and summarized records from Zelalem Lindo MD from current year  and I reviewed / ordered labs.   From review of records :    Pt has Multinodular Goiter    The last TSH from Nov 2019 was normal at 0.6  The last thryoid scan from 2018 had diffuse uptake of 10% at 4 h and 25% at 24 h without cold defects    At this point , given that TSH is normal we can't state that she has a toxic multinodular goiter and hence I can't state that it is causing SVT.     If TSH suppresses on f/u assessment , particularly if degree of suppression becomes less than 0.1 or free T4 or Free T3 rises then I do suggest to treat with methimazole but not at this point since TSH is normal.  This is what made patient upset as she stated that what was the purpose of coming if I wasn't going to treat her .     Please follow TSH , if it suppresses start methimazole. A reasonable starting dose would be 5 mg daily .     Pertaining the left inferior nodule . It had nondiagnostic FNA in 2018 . It has remained stable in size from 2018 to 2020   If she becomes hyperthyroid then there is no need for FNA as it has proved increased uptake  If TSH doesn't suppress then the options are   A. Repeat FNA  B. Serial US every 1 year   C. Refer for left thyroidectomy   We could continue observing.     Again, patient left upset but to the best of my knowledge I have " highlighted my suggestions as above        Please see my above opinion and suggestions.          A copy of my note was sent to Zelalem Lindo MD                This document has been electronically signed by Flakito Graf MD on March 3, 2020 6:12 PM

## 2020-10-21 ENCOUNTER — TRANSCRIBE ORDERS (OUTPATIENT)
Dept: ADMINISTRATIVE | Facility: HOSPITAL | Age: 50
End: 2020-10-21

## 2020-10-21 ENCOUNTER — APPOINTMENT (OUTPATIENT)
Dept: PREADMISSION TESTING | Facility: HOSPITAL | Age: 50
End: 2020-10-21

## 2020-10-21 VITALS
RESPIRATION RATE: 20 BRPM | DIASTOLIC BLOOD PRESSURE: 65 MMHG | HEIGHT: 65 IN | SYSTOLIC BLOOD PRESSURE: 138 MMHG | HEART RATE: 113 BPM | OXYGEN SATURATION: 97 % | BODY MASS INDEX: 48.82 KG/M2 | WEIGHT: 293 LBS

## 2020-10-21 DIAGNOSIS — D03.9 MELANOMA IN SITU, UNSPECIFIED SITE (HCC): Primary | ICD-10-CM

## 2020-10-21 LAB
ALBUMIN SERPL-MCNC: 4.2 G/DL (ref 3.5–5.2)
ALBUMIN/GLOB SERPL: 1 G/DL
ALP SERPL-CCNC: 93 U/L (ref 39–117)
ALT SERPL W P-5'-P-CCNC: 83 U/L (ref 1–33)
ANION GAP SERPL CALCULATED.3IONS-SCNC: 10 MMOL/L (ref 5–15)
AST SERPL-CCNC: 55 U/L (ref 1–32)
BASOPHILS # BLD AUTO: 0.04 10*3/MM3 (ref 0–0.2)
BASOPHILS NFR BLD AUTO: 0.4 % (ref 0–1.5)
BILIRUB SERPL-MCNC: 0.3 MG/DL (ref 0–1.2)
BUN SERPL-MCNC: 8 MG/DL (ref 6–20)
BUN/CREAT SERPL: 21.6 (ref 7–25)
CALCIUM SPEC-SCNC: 9 MG/DL (ref 8.6–10.5)
CHLORIDE SERPL-SCNC: 103 MMOL/L (ref 98–107)
CO2 SERPL-SCNC: 22 MMOL/L (ref 22–29)
CREAT SERPL-MCNC: 0.37 MG/DL (ref 0.57–1)
DEPRECATED RDW RBC AUTO: 39.9 FL (ref 37–54)
EOSINOPHIL # BLD AUTO: 0.11 10*3/MM3 (ref 0–0.4)
EOSINOPHIL NFR BLD AUTO: 1.1 % (ref 0.3–6.2)
ERYTHROCYTE [DISTWIDTH] IN BLOOD BY AUTOMATED COUNT: 12.8 % (ref 12.3–15.4)
GFR SERPL CREATININE-BSD FRML MDRD: >150 ML/MIN/1.73
GLOBULIN UR ELPH-MCNC: 4.1 GM/DL
GLUCOSE SERPL-MCNC: 197 MG/DL (ref 65–99)
HCT VFR BLD AUTO: 44.6 % (ref 34–46.6)
HGB BLD-MCNC: 14.8 G/DL (ref 12–15.9)
IMM GRANULOCYTES # BLD AUTO: 0.05 10*3/MM3 (ref 0–0.05)
IMM GRANULOCYTES NFR BLD AUTO: 0.5 % (ref 0–0.5)
LYMPHOCYTES # BLD AUTO: 2.4 10*3/MM3 (ref 0.7–3.1)
LYMPHOCYTES NFR BLD AUTO: 23.6 % (ref 19.6–45.3)
MCH RBC QN AUTO: 28.4 PG (ref 26.6–33)
MCHC RBC AUTO-ENTMCNC: 33.2 G/DL (ref 31.5–35.7)
MCV RBC AUTO: 85.4 FL (ref 79–97)
MONOCYTES # BLD AUTO: 0.59 10*3/MM3 (ref 0.1–0.9)
MONOCYTES NFR BLD AUTO: 5.8 % (ref 5–12)
NEUTROPHILS NFR BLD AUTO: 6.97 10*3/MM3 (ref 1.7–7)
NEUTROPHILS NFR BLD AUTO: 68.6 % (ref 42.7–76)
NRBC BLD AUTO-RTO: 0 /100 WBC (ref 0–0.2)
PLATELET # BLD AUTO: 311 10*3/MM3 (ref 140–450)
PMV BLD AUTO: 10.3 FL (ref 6–12)
POTASSIUM SERPL-SCNC: 3.9 MMOL/L (ref 3.5–5.2)
PROT SERPL-MCNC: 8.3 G/DL (ref 6–8.5)
RBC # BLD AUTO: 5.22 10*6/MM3 (ref 3.77–5.28)
SODIUM SERPL-SCNC: 135 MMOL/L (ref 136–145)
WBC # BLD AUTO: 10.16 10*3/MM3 (ref 3.4–10.8)

## 2020-10-21 PROCEDURE — 93005 ELECTROCARDIOGRAM TRACING: CPT

## 2020-10-21 PROCEDURE — 80053 COMPREHEN METABOLIC PANEL: CPT | Performed by: SPECIALIST

## 2020-10-21 PROCEDURE — 93010 ELECTROCARDIOGRAM REPORT: CPT | Performed by: INTERNAL MEDICINE

## 2020-10-21 PROCEDURE — 85025 COMPLETE CBC W/AUTO DIFF WBC: CPT | Performed by: SPECIALIST

## 2020-10-21 PROCEDURE — 36415 COLL VENOUS BLD VENIPUNCTURE: CPT

## 2020-10-21 NOTE — DISCHARGE INSTRUCTIONS
DAY OF SURGERY INSTRUCTIONS        YOUR SURGEON: LAN VEGA    PROCEDURE: WIDE EXCISION MELANOMA LEFT BACK WITH LEFT AXILLARY SENTINEL LYMPH NODE BIOPSY    DATE OF SURGERY: October 26, 2020    ARRIVAL TIME: AS DIRECTED BY OFFICE    YOU MAY TAKE THE FOLLOWING MEDICATION(S) THE MORNING OF SURGERY WITH A SIP OF WATER: METOPROLOL      ALL OTHER HOME MEDICATION CHECK WITH YOUR PHYSICIAN      DO NOT TAKE ANY ERECTILE DYSFUNCTION MEDICATIONS (EX: CIALIS, VIAGRA) 24 HOURS PRIOR TO SURGERY                      MANAGING PAIN AFTER SURGERY    We know you are probably wondering what your pain will be like after surgery.  Following surgery it is unrealistic to expect you will not have pain.   Pain is how our bodies let us know that something is wrong or cautions us to be careful.  That said, our goal is to make your pain tolerable.    Methods we may use to treat your pain include (oral or IV medications, PCAs, epidurals, nerve blocks, etc.)   While some procedures require IV pain medications for a short time after surgery, transitioning to pain medications by mouth allows for better management of pain.   Your nurse will encourage you to take oral pain medications whenever possible.  IV medications work almost immediately, but only last a short while.  Taking medications by mouth allows for a more constant level of medication in your blood stream for a longer period of time.      Once your pain is out of control it is harder to get back under control.  It is important you are aware when your next dose of pain medication is due.  If you are admitted, your nurse may write the time of your next dose on the white board in your room to help you remember.      We are interested in your pain and encourage you to inform us about aggravating factors during your visit.   Many times a simple repositioning every few hours can make a big difference.    If your physician says it is okay, do not let your pain prevent you from getting out  of bed. Be sure to call your nurse for assistance prior to getting up so you do not fall.      Before surgery, please decide your tolerable pain goal.  These faces help describe the pain ratings we use on a 0-10 scale.   Be prepared to tell us your goal and whether or not you take pain or anxiety medications at home.          BEFORE YOU COME TO THE HOSPITAL  (Pre-op instructions)  • Do not eat, drink, smoke or chew gum after midnight the night before surgery.  This also includes no mints.  • Morning of surgery take only the medicines you have been instructed with a sip of water unless otherwise instructed  by your physician.  • Do not shave, wear makeup or dark nail polish.  • Remove all jewelry including rings.  • Leave anything you consider valuable at home.  • Leave your suitcase in the car until after your surgery.  • Bring the following with you if applicable:  o Picture ID and insurance, Medicare or Medicaid cards  o Co-pay/deductible required by insurance (cash, check, credit card)  o Copy of advance directive, living will or power-of- documents if not brought to PAT  o CPAP or BIPAP mask and tubing  o Relaxation aids ( book, magazine), etc.  o Hearing aids                        ON THE DAY OF SURGERY  · On the day of surgery check in at registration located at the main entrance of the hospital.   ? You will be registered and given a beeper with instructions where to wait in the main lobby.  ? When your beeper lights up and vibrates a member of the Outpatient Surgery staff will meet you at the double doors under the stair steps and escort you to your preoperative room.   · You may have cloth compression devices placed on your legs. These help to prevent blood clots and reduce swelling in your legs.  · An IV may be inserted into one of your veins.  · In the operating room, you may be given one or more of the following:  ? A medicine to help you relax (sedative).  ? A medicine to numb the area (local  "anesthetic).  ? A medicine to make you fall asleep (general anesthetic).  ? A medicine that is injected into an area of your body to numb everything below the injection site (regional anesthetic).  · Your surgical site will be marked or identified.  · You may be given an antibiotic through your IV to help prevent infection.  Contact a health care provider if you:  · Develop a fever of more than 100.4°F (38°C) or other feelings of illness during the 48 hours before your surgery.  · Have symptoms that get worse.  Have questions or concerns about your surgery    General Anesthesia/Surgery, Adult  General anesthesia is the use of medicines to make a person \"go to sleep\" (unconscious) for a medical procedure. General anesthesia must be used for certain procedures, and is often recommended for procedures that:  · Last a long time.  · Require you to be still or in an unusual position.  · Are major and can cause blood loss.  The medicines used for general anesthesia are called general anesthetics. As well as making you unconscious for a certain amount of time, these medicines:  · Prevent pain.  · Control your blood pressure.  · Relax your muscles.  Tell a health care provider about:  · Any allergies you have.  · All medicines you are taking, including vitamins, herbs, eye drops, creams, and over-the-counter medicines.  · Any problems you or family members have had with anesthetic medicines.  · Types of anesthetics you have had in the past.  · Any blood disorders you have.  · Any surgeries you have had.  · Any medical conditions you have.  · Any recent upper respiratory, chest, or ear infections.  · Any history of:  ? Heart or lung conditions, such as heart failure, sleep apnea, asthma, or chronic obstructive pulmonary disease (COPD).  ?  service.  ? Depression or anxiety.  · Any tobacco or drug use, including marijuana or alcohol use.  · Whether you are pregnant or may be pregnant.  What are the risks?  Generally, " this is a safe procedure. However, problems may occur, including:  · Allergic reaction.  · Lung and heart problems.  · Inhaling food or liquid from the stomach into the lungs (aspiration).  · Nerve injury.  · Air in the bloodstream, which can lead to stroke.  · Extreme agitation or confusion (delirium) when you wake up from the anesthetic.  · Waking up during your procedure and being unable to move. This is rare.  These problems are more likely to develop if you are having a major surgery or if you have an advanced or serious medical condition. You can prevent some of these complications by answering all of your health care provider's questions thoroughly and by following all instructions before your procedure.  General anesthesia can cause side effects, including:  · Nausea or vomiting.  · A sore throat from the breathing tube.  · Hoarseness.  · Wheezing or coughing.  · Shaking chills.  · Tiredness.  · Body aches.  · Anxiety.  · Sleepiness or drowsiness.  · Confusion or agitation.  RISKS AND COMPLICATIONS OF SURGERY  Your health care provider will discuss possible risks and complications with you before surgery. Common risks and complications include:    · Problems due to the use of anesthetics.  · Blood loss and replacement (does not apply to minor surgical procedures).  · Temporary increase in pain due to surgery.  · Uncorrected pain or problems that the surgery was meant to correct.  · Infection.  · New damage.    What happens before the procedure?    Medicines  Ask your health care provider about:  · Changing or stopping your regular medicines. This is especially important if you are taking diabetes medicines or blood thinners.  · Taking medicines such as aspirin and ibuprofen. These medicines can thin your blood. Do not take these medicines unless your health care provider tells you to take them.  · Taking over-the-counter medicines, vitamins, herbs, and supplements. Do not take these during the week before  your procedure unless your health care provider approves them.  General instructions  · Starting 3-6 weeks before the procedure, do not use any products that contain nicotine or tobacco, such as cigarettes and e-cigarettes. If you need help quitting, ask your health care provider.  · If you brush your teeth on the morning of the procedure, make sure to spit out all of the toothpaste.  · Tell your health care provider if you become ill or develop a cold, cough, or fever.  · If instructed by your health care provider, bring your sleep apnea device with you on the day of your surgery (if applicable).  · Ask your health care provider if you will be going home the same day, the following day, or after a longer hospital stay.  ? Plan to have someone take you home from the hospital or clinic.  ? Plan to have a responsible adult care for you for at least 24 hours after you leave the hospital or clinic. This is important.  What happens during the procedure?  · You will be given anesthetics through both of the following:  ? A mask placed over your nose and mouth.  ? An IV in one of your veins.  · You may receive a medicine to help you relax (sedative).  · After you are unconscious, a breathing tube may be inserted down your throat to help you breathe. This will be removed before you wake up.  · An anesthesia specialist will stay with you throughout your procedure. He or she will:  ? Keep you comfortable and safe by continuing to give you medicines and adjusting the amount of medicine that you get.  ? Monitor your blood pressure, pulse, and oxygen levels to make sure that the anesthetics do not cause any problems.  The procedure may vary among health care providers and hospitals.  What happens after the procedure?  · Your blood pressure, temperature, heart rate, breathing rate, and blood oxygen level will be monitored until the medicines you were given have worn off.  · You will wake up in a recovery area. You may wake up  slowly.  · If you feel anxious or agitated, you may be given medicine to help you calm down.  · If you will be going home the same day, your health care provider may check to make sure you can walk, drink, and urinate.  · Your health care provider will treat any pain or side effects you have before you go home.  · Do not drive for 24 hours if you were given a sedative.  Summary  · General anesthesia is used to keep you still and prevent pain during a procedure.  · It is important to tell your healthcare provider about your medical history and any surgeries you have had, and previous experience with anesthesia.  · Follow your healthcare provider’s instructions about when to stop eating, drinking, or taking certain medicines before your procedure.  · Plan to have someone take you home from the hospital or clinic.  This information is not intended to replace advice given to you by your health care provider. Make sure you discuss any questions you have with your health care provider.  Document Released: 03/26/2009 Document Revised: 08/03/2018 Document Reviewed: 08/03/2018  [employer] Interactive Patient Education © 2019 [employer]       Fall Prevention in Hospitals, Adult  As a hospital patient, your condition and the treatments you receive can increase your risk for falls. Some additional risk factors for falls in a hospital include:  · Being in an unfamiliar environment.  · Being on bed rest.  · Your surgery.  · Taking certain medicines.  · Your tubing requirements, such as intravenous (IV) therapy or catheters.  It is important that you learn how to decrease fall risks while at the hospital. Below are important tips that can help prevent falls.  SAFETY TIPS FOR PREVENTING FALLS  Talk about your risk of falling.  · Ask your health care provider why you are at risk for falling. Is it your medicine, illness, tubing placement, or something else?  · Make a plan with your health care provider to keep you safe from  falls.  · Ask your health care provider or pharmacist about side effects of your medicines. Some medicines can make you dizzy or affect your coordination.  Ask for help.  · Ask for help before getting out of bed. You may need to press your call button.  · Ask for assistance in getting safely to the toilet.  · Ask for a walker or cane to be put at your bedside. Ask that most of the side rails on your bed be placed up before your health care provider leaves the room.  · Ask family or friends to sit with you.  · Ask for things that are out of your reach, such as your glasses, hearing aids, telephone, bedside table, or call button.  Follow these tips to avoid falling:  · Stay lying or seated, rather than standing, while waiting for help.  · Wear rubber-soled slippers or shoes whenever you walk in the hospital.  · Avoid quick, sudden movements.  ¨ Change positions slowly.  ¨ Sit on the side of your bed before standing.  ¨ Stand up slowly and wait before you start to walk.  · Let your health care provider know if there is a spill on the floor.  · Pay careful attention to the medical equipment, electrical cords, and tubes around you.  · When you need help, use your call button by your bed or in the bathroom. Wait for one of your health care providers to help you.  · If you feel dizzy or unsure of your footing, return to bed and wait for assistance.  · Avoid being distracted by the TV, telephone, or another person in your room.  · Do not lean or support yourself on rolling objects, such as IV poles or bedside tables.     This information is not intended to replace advice given to you by your health care provider. Make sure you discuss any questions you have with your health care provider.     Document Released: 12/15/2001 Document Revised: 01/08/2016 Document Reviewed: 08/25/2013  [employer] Interactive Patient Education ©2016 [employer]       [facility] 4% Patient Instruction Sheet    Chlorhexidine Before  Surgery  Chlorhexidine gluconate (CHG) is a germ-killing (antiseptic) solution that is used to clean the skin. It gets rid of the bacteria that normally live on the skin. Cleaning your skin with CHG before surgery helps lower the risk for infection after surgery.    How to use CHG solution  · You will take 2 showers, one shower the night before surgery, the second shower the morning of surgery before coming to the hospital.  · Use CHG only as told by your health care provider, and follow the instructions on the label.  · Use CHG solution while taking a shower. Follow these steps when using CHG solution (unless your health care provider gives you different instructions):  1. Start the shower.  2. Use your normal soap and shampoo to wash your face and hair.  3. Turn off the shower or move out of the shower stream.  4. Pour the CHG onto a clean washcloth. Do not use any type of brush or rough-edged sponge.  5. Starting at your neck, lather your body down to your toes. Make sure you:  6. Pay special attention to the part of your body where you will be having surgery. Scrub this area for at least 1 minute.  7. Use the full amount of CHG as directed. Usually, this is one half bottle for each shower.  8. Do not use CHG on your head or face. If the solution gets into your ears or eyes, rinse them well with water.  9. Avoid your genital area.  10. Avoid any areas of skin that have broken skin, cuts, or scrapes.  11. Scrub your back and under your arms. Make sure to wash skin folds.  12. Let the lather sit on your skin for 1-2 minutes or as long as told by your health care  provider.  13. Thoroughly rinse your entire body in the shower. Make sure that all body creases and crevices are rinsed well.  14. Dry off with a clean towel. Do not put any substances on your body afterward, such as powder, lotion, or perfume.  15. Put on clean clothes or pajamas.  16. If it is the night before your surgery, sleep in clean sheets.    What  are the risks?  Risks of using CHG include:  · A skin reaction.  · Hearing loss, if CHG gets in your ears.  · Eye injury, if CHG gets in your eyes and is not rinsed out.  · The CHG product catching fire.  Make sure that you avoid smoking and flames after applying CHG to your skin.  Do not use CHG:  · If you have a chlorhexidine allergy or have previously reacted to chlorhexidine.  · On babies younger than 2 months of age.      On the day of surgery, when you are taken to your room in Outpatient Surgery you will be given a CHG prepackaged cloth to wipe the site for your surgery.  How to use CHG prepackaged cloths  · Follow the instructions on the label.  · Use the CHG cloth on clean, dry skin. Follow these steps when using a CHG cloth (unless your health care provider gives you different instructions):  1. Using the CHG cloth, vigorously scrub the part of your body where you will be having surgery. Scrub using a back-and-forth motion for 3 minutes. The area on your body should be completely wet with CHG when you are finished scrubbing.  2. Do not rinse. Discard the cloth and let the area air-dry for 1 minute. Do not put any substances on your body afterward, such as powder, lotion, or perfume.  Contact a health care provider if:  · Your skin gets irritated after scrubbing.  · You have questions about using your solution or cloth.  Get help right away if:  · Your eyes become very red or swollen.  · Your eyes itch badly.  · Your skin itches badly and is red or swollen.  · Your hearing changes.  · You have trouble seeing.  · You have swelling or tingling in your mouth or throat.  · You have trouble breathing.  · You swallow any chlorhexidine.  Summary  · Chlorhexidine gluconate (CHG) is a germ-killing (antiseptic) solution that is used to clean the skin. Cleaning your skin with CHG before surgery helps lower the risk for infection after surgery.  · You may be given CHG to use at home. It may be in a bottle or in a  prepackaged cloth to use on your skin. Carefully follow your health care provider's instructions and the instructions on the product label.  · Do not use CHG if you have a chlorhexidine allergy.  · Contact your health care provider if your skin gets irritated after scrubbing.  This information is not intended to replace advice given to you by your health care provider. Make sure you discuss any questions you have with your health care provider.  Document Released: 09/11/2013 Document Revised: 11/15/2018 Document Reviewed: 11/15/2018  [employer] Interactive Patient Education © 2019 [employer]          PATIENT/FAMILY/RESPONSIBLE PARTY VERBALIZES UNDERSTANDING OF ABOVE EDUCATION.  COPY OF PAIN SCALE GIVEN AND REVIEWED WITH VERBALIZED UNDERSTANDING.

## 2020-10-22 ENCOUNTER — TRANSCRIBE ORDERS (OUTPATIENT)
Dept: ADMINISTRATIVE | Facility: HOSPITAL | Age: 50
End: 2020-10-22

## 2020-10-26 ENCOUNTER — HOSPITAL ENCOUNTER (OUTPATIENT)
Facility: HOSPITAL | Age: 50
Setting detail: HOSPITAL OUTPATIENT SURGERY
Discharge: HOME | End: 2020-10-26
Attending: SPECIALIST | Admitting: SPECIALIST

## 2020-10-26 ENCOUNTER — HOSPITAL ENCOUNTER (OUTPATIENT)
Dept: NUCLEAR MEDICINE | Facility: HOSPITAL | Age: 50
Discharge: HOME | End: 2020-10-26

## 2020-10-26 ENCOUNTER — ANESTHESIA (OUTPATIENT)
Dept: PERIOP | Facility: HOSPITAL | Age: 50
End: 2020-10-26

## 2020-10-26 ENCOUNTER — ANESTHESIA EVENT (OUTPATIENT)
Dept: PERIOP | Facility: HOSPITAL | Age: 50
End: 2020-10-26

## 2020-10-26 VITALS
DIASTOLIC BLOOD PRESSURE: 64 MMHG | HEART RATE: 87 BPM | RESPIRATION RATE: 16 BRPM | SYSTOLIC BLOOD PRESSURE: 119 MMHG | TEMPERATURE: 97 F | OXYGEN SATURATION: 98 %

## 2020-10-26 DIAGNOSIS — C43.9 MELANOMA (HCC): ICD-10-CM

## 2020-10-26 DIAGNOSIS — D03.62 MELANOMA IN SITU OF LEFT UPPER EXTREMITY INCLUDING SHOULDER (HCC): Primary | ICD-10-CM

## 2020-10-26 DIAGNOSIS — D03.9 MELANOMA IN SITU, UNSPECIFIED SITE (HCC): ICD-10-CM

## 2020-10-26 LAB
B-HCG UR QL: NEGATIVE
GLUCOSE BLDC GLUCOMTR-MCNC: 160 MG/DL (ref 70–130)
GLUCOSE BLDC GLUCOMTR-MCNC: 279 MG/DL (ref 70–130)
GLUCOSE BLDC GLUCOMTR-MCNC: 294 MG/DL (ref 70–130)

## 2020-10-26 PROCEDURE — 25010000002 ONDANSETRON PER 1 MG: Performed by: ANESTHESIOLOGY

## 2020-10-26 PROCEDURE — 0 TECHNETIUM FILTERED SULFUR COLLOID: Performed by: SPECIALIST

## 2020-10-26 PROCEDURE — 25010000002 FENTANYL CITRATE (PF) 100 MCG/2ML SOLUTION: Performed by: NURSE ANESTHETIST, CERTIFIED REGISTERED

## 2020-10-26 PROCEDURE — A9541 TC99M SULFUR COLLOID: HCPCS | Performed by: SPECIALIST

## 2020-10-26 PROCEDURE — 88305 TISSUE EXAM BY PATHOLOGIST: CPT | Performed by: SPECIALIST

## 2020-10-26 PROCEDURE — 82962 GLUCOSE BLOOD TEST: CPT

## 2020-10-26 PROCEDURE — 78195 LYMPH SYSTEM IMAGING: CPT

## 2020-10-26 PROCEDURE — 25010000002 VANCOMYCIN 1 G RECONSTITUTED SOLUTION: Performed by: SPECIALIST

## 2020-10-26 PROCEDURE — 25010000002 ONDANSETRON PER 1 MG: Performed by: NURSE ANESTHETIST, CERTIFIED REGISTERED

## 2020-10-26 PROCEDURE — 25010000002 FENTANYL CITRATE (PF) 100 MCG/2ML SOLUTION: Performed by: ANESTHESIOLOGY

## 2020-10-26 PROCEDURE — 25010000002 DEXAMETHASONE PER 1 MG: Performed by: ANESTHESIOLOGY

## 2020-10-26 PROCEDURE — 25010000002 PROPOFOL 10 MG/ML EMULSION: Performed by: NURSE ANESTHETIST, CERTIFIED REGISTERED

## 2020-10-26 PROCEDURE — 81025 URINE PREGNANCY TEST: CPT | Performed by: SPECIALIST

## 2020-10-26 RX ORDER — EPHEDRINE SULFATE 50 MG/ML
INJECTION, SOLUTION INTRAVENOUS AS NEEDED
Status: DISCONTINUED | OUTPATIENT
Start: 2020-10-26 | End: 2020-10-26 | Stop reason: SURG

## 2020-10-26 RX ORDER — NEOSTIGMINE METHYLSULFATE 5 MG/5 ML
SYRINGE (ML) INTRAVENOUS AS NEEDED
Status: DISCONTINUED | OUTPATIENT
Start: 2020-10-26 | End: 2020-10-26 | Stop reason: SURG

## 2020-10-26 RX ORDER — HYDROCODONE BITARTRATE AND ACETAMINOPHEN 7.5; 325 MG/1; MG/1
1 TABLET ORAL EVERY 4 HOURS PRN
Qty: 30 TABLET | Refills: 0 | Status: SHIPPED | OUTPATIENT
Start: 2020-10-26

## 2020-10-26 RX ORDER — LIDOCAINE HYDROCHLORIDE 10 MG/ML
0.5 INJECTION, SOLUTION EPIDURAL; INFILTRATION; INTRACAUDAL; PERINEURAL ONCE AS NEEDED
Status: DISCONTINUED | OUTPATIENT
Start: 2020-10-26 | End: 2020-10-26 | Stop reason: HOSPADM

## 2020-10-26 RX ORDER — FLUMAZENIL 0.1 MG/ML
0.2 INJECTION INTRAVENOUS AS NEEDED
Status: DISCONTINUED | OUTPATIENT
Start: 2020-10-26 | End: 2020-10-26 | Stop reason: HOSPADM

## 2020-10-26 RX ORDER — SODIUM CHLORIDE, SODIUM LACTATE, POTASSIUM CHLORIDE, CALCIUM CHLORIDE 600; 310; 30; 20 MG/100ML; MG/100ML; MG/100ML; MG/100ML
1000 INJECTION, SOLUTION INTRAVENOUS CONTINUOUS
Status: DISCONTINUED | OUTPATIENT
Start: 2020-10-26 | End: 2020-10-26 | Stop reason: HOSPADM

## 2020-10-26 RX ORDER — FAMOTIDINE 10 MG/ML
20 INJECTION, SOLUTION INTRAVENOUS
Status: COMPLETED | OUTPATIENT
Start: 2020-10-26 | End: 2020-10-26

## 2020-10-26 RX ORDER — LIDOCAINE HYDROCHLORIDE 10 MG/ML
0.5 INJECTION, SOLUTION EPIDURAL; INFILTRATION; INTRACAUDAL; PERINEURAL ONCE AS NEEDED
Status: DISCONTINUED | OUTPATIENT
Start: 2020-10-26 | End: 2020-10-26 | Stop reason: SDUPTHER

## 2020-10-26 RX ORDER — SODIUM CHLORIDE 0.9 % (FLUSH) 0.9 %
3 SYRINGE (ML) INJECTION AS NEEDED
Status: DISCONTINUED | OUTPATIENT
Start: 2020-10-26 | End: 2020-10-26 | Stop reason: HOSPADM

## 2020-10-26 RX ORDER — ACETAMINOPHEN 500 MG
1000 TABLET ORAL ONCE
Status: COMPLETED | OUTPATIENT
Start: 2020-10-26 | End: 2020-10-26

## 2020-10-26 RX ORDER — FENTANYL CITRATE 50 UG/ML
INJECTION, SOLUTION INTRAMUSCULAR; INTRAVENOUS AS NEEDED
Status: DISCONTINUED | OUTPATIENT
Start: 2020-10-26 | End: 2020-10-26 | Stop reason: SURG

## 2020-10-26 RX ORDER — ROCURONIUM BROMIDE 10 MG/ML
INJECTION, SOLUTION INTRAVENOUS AS NEEDED
Status: DISCONTINUED | OUTPATIENT
Start: 2020-10-26 | End: 2020-10-26 | Stop reason: SURG

## 2020-10-26 RX ORDER — OXYCODONE AND ACETAMINOPHEN 7.5; 325 MG/1; MG/1
2 TABLET ORAL EVERY 4 HOURS PRN
Status: DISCONTINUED | OUTPATIENT
Start: 2020-10-26 | End: 2020-10-26 | Stop reason: HOSPADM

## 2020-10-26 RX ORDER — METHYLCELLULOSE 2 G/19G
2 POWDER, FOR SOLUTION ORAL DAILY
Qty: 60 G | Refills: 6 | Status: SHIPPED | OUTPATIENT
Start: 2020-10-26 | End: 2020-11-25

## 2020-10-26 RX ORDER — PROPOFOL 10 MG/ML
VIAL (ML) INTRAVENOUS AS NEEDED
Status: DISCONTINUED | OUTPATIENT
Start: 2020-10-26 | End: 2020-10-26 | Stop reason: SURG

## 2020-10-26 RX ORDER — LABETALOL HYDROCHLORIDE 5 MG/ML
5 INJECTION, SOLUTION INTRAVENOUS
Status: DISCONTINUED | OUTPATIENT
Start: 2020-10-26 | End: 2020-10-26 | Stop reason: HOSPADM

## 2020-10-26 RX ORDER — ONDANSETRON 2 MG/ML
4 INJECTION INTRAMUSCULAR; INTRAVENOUS ONCE AS NEEDED
Status: COMPLETED | OUTPATIENT
Start: 2020-10-26 | End: 2020-10-26

## 2020-10-26 RX ORDER — SODIUM CHLORIDE 0.9 % (FLUSH) 0.9 %
3-10 SYRINGE (ML) INJECTION AS NEEDED
Status: DISCONTINUED | OUTPATIENT
Start: 2020-10-26 | End: 2020-10-26 | Stop reason: HOSPADM

## 2020-10-26 RX ORDER — OXYCODONE AND ACETAMINOPHEN 10; 325 MG/1; MG/1
1 TABLET ORAL ONCE AS NEEDED
Status: DISCONTINUED | OUTPATIENT
Start: 2020-10-26 | End: 2020-10-26 | Stop reason: HOSPADM

## 2020-10-26 RX ORDER — MAGNESIUM HYDROXIDE 1200 MG/15ML
LIQUID ORAL AS NEEDED
Status: DISCONTINUED | OUTPATIENT
Start: 2020-10-26 | End: 2020-10-26 | Stop reason: HOSPADM

## 2020-10-26 RX ORDER — NALOXONE HCL 0.4 MG/ML
0.4 VIAL (ML) INJECTION AS NEEDED
Status: DISCONTINUED | OUTPATIENT
Start: 2020-10-26 | End: 2020-10-26 | Stop reason: HOSPADM

## 2020-10-26 RX ORDER — SODIUM CHLORIDE 0.9 % (FLUSH) 0.9 %
3 SYRINGE (ML) INJECTION EVERY 12 HOURS SCHEDULED
Status: DISCONTINUED | OUTPATIENT
Start: 2020-10-26 | End: 2020-10-26 | Stop reason: HOSPADM

## 2020-10-26 RX ORDER — ONDANSETRON HCL 8 MG
8 TABLET ORAL EVERY 8 HOURS PRN
Qty: 10 TABLET | Refills: 1 | Status: SHIPPED | OUTPATIENT
Start: 2020-10-26

## 2020-10-26 RX ORDER — SULFAMETHOXAZOLE AND TRIMETHOPRIM 800; 160 MG/1; MG/1
1 TABLET ORAL 2 TIMES DAILY
Qty: 14 TABLET | Refills: 0 | Status: SHIPPED | OUTPATIENT
Start: 2020-10-26 | End: 2020-11-02

## 2020-10-26 RX ORDER — FENTANYL CITRATE 50 UG/ML
25 INJECTION, SOLUTION INTRAMUSCULAR; INTRAVENOUS
Status: DISCONTINUED | OUTPATIENT
Start: 2020-10-26 | End: 2020-10-26 | Stop reason: HOSPADM

## 2020-10-26 RX ORDER — IBUPROFEN 600 MG/1
600 TABLET ORAL ONCE AS NEEDED
Status: DISCONTINUED | OUTPATIENT
Start: 2020-10-26 | End: 2020-10-26 | Stop reason: HOSPADM

## 2020-10-26 RX ORDER — DEXAMETHASONE SODIUM PHOSPHATE 4 MG/ML
4 INJECTION, SOLUTION INTRA-ARTICULAR; INTRALESIONAL; INTRAMUSCULAR; INTRAVENOUS; SOFT TISSUE ONCE AS NEEDED
Status: COMPLETED | OUTPATIENT
Start: 2020-10-26 | End: 2020-10-26

## 2020-10-26 RX ORDER — LIDOCAINE AND PRILOCAINE 25; 25 MG/G; MG/G
CREAM TOPICAL ONCE
Status: COMPLETED | OUTPATIENT
Start: 2020-10-26 | End: 2020-10-26

## 2020-10-26 RX ORDER — SCOLOPAMINE TRANSDERMAL SYSTEM 1 MG/1
1 PATCH, EXTENDED RELEASE TRANSDERMAL CONTINUOUS
Status: DISCONTINUED | OUTPATIENT
Start: 2020-10-26 | End: 2020-10-26 | Stop reason: HOSPADM

## 2020-10-26 RX ORDER — SODIUM CHLORIDE, SODIUM LACTATE, POTASSIUM CHLORIDE, CALCIUM CHLORIDE 600; 310; 30; 20 MG/100ML; MG/100ML; MG/100ML; MG/100ML
100 INJECTION, SOLUTION INTRAVENOUS CONTINUOUS
Status: DISCONTINUED | OUTPATIENT
Start: 2020-10-26 | End: 2020-10-26 | Stop reason: HOSPADM

## 2020-10-26 RX ORDER — ONDANSETRON 2 MG/ML
INJECTION INTRAMUSCULAR; INTRAVENOUS AS NEEDED
Status: DISCONTINUED | OUTPATIENT
Start: 2020-10-26 | End: 2020-10-26 | Stop reason: SURG

## 2020-10-26 RX ADMIN — EPHEDRINE SULFATE 25 MG: 50 INJECTION INTRAVENOUS at 11:12

## 2020-10-26 RX ADMIN — DEXAMETHASONE SODIUM PHOSPHATE 4 MG: 4 INJECTION, SOLUTION INTRAMUSCULAR; INTRAVENOUS at 09:17

## 2020-10-26 RX ADMIN — ACETAMINOPHEN 1000 MG: 500 TABLET, FILM COATED ORAL at 09:16

## 2020-10-26 RX ADMIN — ONDANSETRON HYDROCHLORIDE 4 MG: 2 SOLUTION INTRAMUSCULAR; INTRAVENOUS at 13:44

## 2020-10-26 RX ADMIN — FAMOTIDINE 20 MG: 10 INJECTION, SOLUTION INTRAVENOUS at 09:17

## 2020-10-26 RX ADMIN — VANCOMYCIN HYDROCHLORIDE 1000 MG: 1 INJECTION, POWDER, LYOPHILIZED, FOR SOLUTION INTRAVENOUS at 10:03

## 2020-10-26 RX ADMIN — PROPOFOL 100 MG: 10 INJECTION, EMULSION INTRAVENOUS at 12:25

## 2020-10-26 RX ADMIN — TECHNETIUM TC 99M SULFUR COLLOID 1 DOSE: KIT at 07:35

## 2020-10-26 RX ADMIN — ONDANSETRON HYDROCHLORIDE 4 MG: 2 SOLUTION INTRAMUSCULAR; INTRAVENOUS at 12:54

## 2020-10-26 RX ADMIN — LIDOCAINE HYDROCHLORIDE 60 MG: 20 INJECTION, SOLUTION INTRAVENOUS at 10:36

## 2020-10-26 RX ADMIN — GLYCOPYRROLATE 0.4 MG: 0.2 INJECTION, SOLUTION INTRAMUSCULAR; INTRAVENOUS at 12:54

## 2020-10-26 RX ADMIN — FENTANYL CITRATE 100 MCG: 50 INJECTION, SOLUTION INTRAMUSCULAR; INTRAVENOUS at 10:30

## 2020-10-26 RX ADMIN — SODIUM CHLORIDE, POTASSIUM CHLORIDE, SODIUM LACTATE AND CALCIUM CHLORIDE 1000 ML: 600; 310; 30; 20 INJECTION, SOLUTION INTRAVENOUS at 06:46

## 2020-10-26 RX ADMIN — Medication 3 MG: at 12:54

## 2020-10-26 RX ADMIN — PROPOFOL 150 MG: 10 INJECTION, EMULSION INTRAVENOUS at 10:30

## 2020-10-26 RX ADMIN — EPHEDRINE SULFATE 25 MG: 50 INJECTION INTRAVENOUS at 10:45

## 2020-10-26 RX ADMIN — ROCURONIUM BROMIDE 30 MG: 10 INJECTION INTRAVENOUS at 10:30

## 2020-10-26 RX ADMIN — SCOPALAMINE 1 PATCH: 1 PATCH, EXTENDED RELEASE TRANSDERMAL at 09:16

## 2020-10-26 RX ADMIN — LIDOCAINE AND PRILOCAINE 1 APPLICATION: 25; 25 CREAM TOPICAL at 06:55

## 2020-10-26 RX ADMIN — FENTANYL CITRATE 150 MCG: 50 INJECTION, SOLUTION INTRAMUSCULAR; INTRAVENOUS at 10:36

## 2020-10-26 NOTE — ANESTHESIA PROCEDURE NOTES
Airway  Urgency: elective    Date/Time: 10/26/2020 10:43 AM  Airway not difficult    General Information and Staff    Patient location during procedure: OR  CRNA: Jeanmarie Conner CRNA    Indications and Patient Condition  Indications for airway management: airway protection    Preoxygenated: yes  Mask difficulty assessment: 1 - vent by mask    Final Airway Details  Final airway type: endotracheal airway      Successful airway: ETT    Successful intubation technique: direct laryngoscopy  Facilitating devices/methods: intubating stylet  Endotracheal tube insertion site: oral  Blade: Anitra  Blade size: 3.5.  ETT size (mm): 7.5  Placement verified by: chest auscultation and capnometry   Measured from: lips  Number of attempts at approach: 1  Assessment: lips, teeth, and gum same as pre-op and atraumatic intubation

## 2020-10-26 NOTE — ANESTHESIA POSTPROCEDURE EVALUATION
Patient: Arminda Mccloud    Procedure Summary     Date: 10/26/20 Room / Location: [facility] OR  /  [facility] OR    Anesthesia Start: 1028 Anesthesia Stop: 1331    Procedure: WIDE EXCISION MELANOMA LEFT BACK AND LEFT AXILLARY SENTINEL LYMPH NODE BIOPSY WITH SPY (Left ) Diagnosis: (MELANOMA)    Surgeon: Markell Gresham MD Provider: Jeanmarie Conner CRNA    Anesthesia Type: general ASA Status: 3          Anesthesia Type: general    Vitals  Vitals Value Taken Time   /61 10/26/20 1435   Temp 97 °F (36.1 °C) 10/26/20 1435   Pulse 83 10/26/20 1435   Resp 20 10/26/20 1435   SpO2 90 % 10/26/20 1435   Vitals shown include unvalidated device data.        Post Anesthesia Care and Evaluation    Patient location during evaluation: PACU  Patient participation: complete - patient participated  Level of consciousness: awake and alert  Pain management: adequate  Airway patency: patent  Anesthetic complications: No anesthetic complications    Cardiovascular status: acceptable  Respiratory status: acceptable  Hydration status: acceptable    Comments: Blood pressure 119/61, pulse 83, temperature 97 °F (36.1 °C), temperature source Temporal, resp. rate 20, SpO2 95 %.    Pt discharged from PACU based on ashok score >8

## 2020-10-26 NOTE — ANESTHESIA PREPROCEDURE EVALUATION
Anesthesia Evaluation     history of anesthetic complications: PONV  NPO Solid Status: > 8 hours  NPO Liquid Status: > 8 hours           Airway   Mallampati: I  TM distance: >3 FB  Neck ROM: full  No difficulty expected  Dental          Pulmonary    (+) asthma,sleep apnea (not using cpap),   (-) not a smoker  Cardiovascular   Exercise tolerance: good (4-7 METS)    Patient on routine beta blocker and Beta blocker given within 24 hours of surgery    (+) dysrhythmias (SVT),   (-) hypertension      Neuro/Psych  (+) psychiatric history Depression,     (-) seizures, TIA, CVA  GI/Hepatic/Renal/Endo    (+) morbid obesity,  diabetes mellitus type 2,   (-) liver disease, no renal disease    Musculoskeletal     Abdominal    Substance History      OB/GYN          Other   arthritis,    history of cancer (melanoma back) active                    Anesthesia Plan    ASA 3     general     intravenous induction     Anesthetic plan, all risks, benefits, and alternatives have been provided, discussed and informed consent has been obtained with: patient.

## 2020-10-26 NOTE — OP NOTE
WIDE EXCISION TRUNK LESION/MASS  Procedure Note    Arminda Mccloud  10/26/2020    Pre-op Diagnosis:   MELANOMA    Post-op Diagnosis:     Wide excision of a left upper shoulder melanoma with 1 cm margins, primary closure, also sentinel lymph node evaluation from the left axilla.  Also spy technology    Procedure/CPT® Codes:      Procedure(s):  WIDE EXCISION MELANOMA LEFT BACK AND LEFT AXILLARY SENTINEL LYMPH NODE BIOPSY WITH SPY and technetium    Surgeon(s):  Markell Gresham MD      Anesthesia: General    Staff:   Specimens     ID Source Type Tests Collected By Collected At Frozen?      A Back, Upper Tissue · TISSUE PATHOLOGY EXAM   Markell Gresham MD 10/26/20 1101 No     Description: LEFT UPPER BACK MELANOMA TISSUE    Comment: STITCH MEDIAL  SUPERIOR    B Axilla, Left Tissue · TISSUE PATHOLOGY EXAM   Markell Gresham MD 10/26/20 1250 No     Description: LEFT  AXILLARY SENTINEL LYMPH NODE  BX TISSUE          Estimated Blood Loss: 50 cc    Specimens:                ID Type Source Tests Collected by Time   A : LEFT UPPER BACK MELANOMA TISSUE Tissue Back, Upper TISSUE PATHOLOGY EXAM Markell Gresham MD 10/26/2020 1101   B : LEFT  AXILLARY SENTINEL LYMPH NODE  BX TISSUE Tissue Axilla, Left TISSUE PATHOLOGY EXAM Markell Gresham MD 10/26/2020 1250         Drains:   Closed/Suction Drain 1 Lateral;Left Other (Comment) Bulb 15 Fr. (Active)       Indications: Arminda Mccloud is a 50-year-old female who had a nodule on her left upper back.  She has diffuse skin changes and is followed by Dr. 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  She is for evaluation and treatment.  She has no other abnormalities she has no palpable lymph nodes noted we have discussed the findings she did advised that she needs to have excision with 1 cm margins also sentinel lymph node evaluation.  Further evaluation has been completed she is aware  the procedure the risk and benefits she appears to understand and gives her informed consent for surgical intervention.    Findings: Excision of the lesion from the left upper back with 1 cm margins, primary closure accomplished without a drain, sentinel lymph node evaluation completed from the left axilla there were 2 large lymph nodes 1 cm in size near the apex of the axilla, some other smaller lymph nodes were removed.  Blood loss is less than 50 cc modifier 22 secondary to a BMI elevation    Complications: No complications blood loss less than 50 cc.    Procedure: Patient is placed in a prone position the surgical suite and after adequate prepping and draping of been completed lines of excision were drawn with 1 cm margins around the previous shave biopsy site.  With this accomplished full excision was completed down to the muscle using the harmonic scalpel this area was removed flaps were undermined the deep subcutaneous tissue was reapproximated using interrupted 2-0 Vicryl suture the deep dermis was reapproximate using simple inverted interrupted sutures of 3-0 Vicryl and the skin was then closed using a running subcuticular suture of 4-0 Vicryl.  With this accomplished Mastisol Steri-Strips 4 x 4 and Tegaderm applied and the patient was then flipped back in a supine position and the left axilla was prepped and draped in the usual fashion.  Once again a timeout was completed with a timeout accomplished an incision was made in a curvilinear incision in the left axilla incising through the skin and subcutaneous tissue dissection down to the axilla was completed deep Ko retractor were needed for this evaluation the latissimus was noted as well as the lateral edge of the pectoralis major and using these 2 borders we dissected further into the axilla noted there were several large lymph nodes noted increased activity using the technetium scan as well as the spy technology was accomplished previously had  injected 0.75 cc I have the spy agent into the left upper back prior to coming out from the holding area.  Using the spy technology as well as the technetium several of the sentinel lymph nodes were noted and removed and sent for pathologic evaluation.  Dissection was completed attachment was accomplished and removed using 3-0 silk as well as 2-0 silk and with the axillary contents removed there is no further notation of technetium marker present in the axilla and no further spy technology noted in the axilla either.  Having completed this the cavity was irrigated with sterile water with clear irrigant noted a 15 mm round Lisandro-Díaz drain is placed in the left axilla affixed to the skin using a 3-0 nylon suture the deep subcutaneous tissue was reapproximate using simple interrupted sutures of 3-0 Vicryl the deep dermis was reapproximated in simple inverted interrupted sutures of 3-0 Vicryl and the skin was then closed using a running subcuticular suture of 4-0 Vicryl.  Following this Mastisol, Steri-Strips, 4 x 4 and Tegaderm applied the patient was then extubated and transported to the recovery room in good condition.  Blood loss was less than 50 cc.    Markell Gresham MD     Date: 10/26/2020  Time: 13:22 CDT    EMR Dragon/Transcription disclaimer: Much of this encounter note is an electronic transcription/translation of spoken language to printed text. The electronic translation of spoken language may permit erroneous, or at times, nonsensical words or phrases to be inadvertently transcribed; although I have reviewed the note for such errors, some may still exist.

## 2020-10-26 NOTE — DISCHARGE INSTRUCTIONS

## 2020-10-27 LAB
CYTO UR: NORMAL
LAB AP CASE REPORT: NORMAL
LAB AP CLINICAL INFORMATION: NORMAL
PATH REPORT.FINAL DX SPEC: NORMAL
PATH REPORT.GROSS SPEC: NORMAL

## 2020-10-28 ENCOUNTER — TELEPHONE (OUTPATIENT)
Dept: ONCOLOGY | Facility: CLINIC | Age: 50
End: 2020-10-28

## 2020-10-28 NOTE — TELEPHONE ENCOUNTER
Called and spoke with patient. We received referral on patient but looked in our old system and she's an old patient of Dr. Sultana, saw last June 2018. So closed referral, no need for referral still established pt of Dr. Metz. Patient is to call back about scheduling, she's scheduled to see Dr. Gresham for this Friday to go over her biopsy results with her.

## 2020-10-30 ENCOUNTER — TELEPHONE (OUTPATIENT)
Dept: ONCOLOGY | Facility: CLINIC | Age: 50
End: 2020-10-30

## 2020-10-30 NOTE — TELEPHONE ENCOUNTER
Caller: Arminda     Relationship to patient: Patient    Best call back number: [phone]     Type of visit: Follow up     Requested date: 11/09, 11/10 or 11/11, prefers morning     Additional notes: Please leave VM if no answer

## 2020-11-09 ENCOUNTER — TELEPHONE (OUTPATIENT)
Dept: ONCOLOGY | Facility: CLINIC | Age: 50
End: 2020-11-09

## 2020-11-09 NOTE — TELEPHONE ENCOUNTER
Caller: DANIELLE VEGA    Relationship to patient: SELF    Best call back number: [phone]    PT STATES THEY NEED TO CANCEL THEIR APPT ON 11/10 AND WILL CALL BACK TO RESCHEDULE

## 2020-11-10 ENCOUNTER — APPOINTMENT (OUTPATIENT)
Dept: LAB | Facility: HOSPITAL | Age: 50
End: 2020-11-10

## 2021-04-13 ENCOUNTER — HOSPITAL ENCOUNTER (OUTPATIENT)
Dept: MRI IMAGING | Age: 51
Discharge: HOME | End: 2021-04-13
Payer: COMMERCIAL

## 2021-04-13 DIAGNOSIS — H53.2 DIPLOPIA: ICD-10-CM

## 2021-04-13 DIAGNOSIS — H49.10 TROCHLEAR NERVE PALSY, UNSPECIFIED LATERALITY: ICD-10-CM

## 2021-04-13 PROCEDURE — 70553 MRI BRAIN STEM W/O & W/DYE: CPT

## 2021-04-13 PROCEDURE — A9577 INJ MULTIHANCE: HCPCS | Performed by: PHYSICIAN ASSISTANT

## 2021-04-13 PROCEDURE — 70544 MR ANGIOGRAPHY HEAD W/O DYE: CPT

## 2021-04-13 PROCEDURE — 6360000004 HC RX CONTRAST MEDICATION: Performed by: PHYSICIAN ASSISTANT

## 2021-04-13 RX ADMIN — GADOBENATE DIMEGLUMINE 20 ML: 529 INJECTION, SOLUTION INTRAVENOUS at 08:32

## 2021-10-26 ENCOUNTER — OFFICE VISIT (OUTPATIENT)
Dept: ENT CLINIC | Age: 51
End: 2021-10-26
Payer: COMMERCIAL

## 2021-10-26 VITALS — WEIGHT: 289 LBS | TEMPERATURE: 98.4 F | HEIGHT: 64 IN | BODY MASS INDEX: 49.34 KG/M2 | OXYGEN SATURATION: 97 %

## 2021-10-26 DIAGNOSIS — E66.01 MORBID OBESITY (HCC): ICD-10-CM

## 2021-10-26 DIAGNOSIS — G47.33 OBSTRUCTIVE SLEEP APNEA SYNDROME: ICD-10-CM

## 2021-10-26 DIAGNOSIS — E04.1 THYROID NODULE: Primary | ICD-10-CM

## 2021-10-26 PROCEDURE — 31575 DIAGNOSTIC LARYNGOSCOPY: CPT | Performed by: PHYSICIAN ASSISTANT

## 2021-10-26 PROCEDURE — 99203 OFFICE O/P NEW LOW 30 MIN: CPT | Performed by: PHYSICIAN ASSISTANT

## 2021-10-26 RX ORDER — SITAGLIPTIN AND METFORMIN HYDROCHLORIDE 1000; 50 MG/1; MG/1
1 TABLET, FILM COATED, EXTENDED RELEASE ORAL DAILY
COMMUNITY

## 2021-10-26 RX ORDER — METOPROLOL SUCCINATE 50 MG/1
50 TABLET, EXTENDED RELEASE ORAL DAILY
COMMUNITY

## 2021-10-26 ASSESSMENT — ENCOUNTER SYMPTOMS
RHINORRHEA: 0
SORE THROAT: 0
EYE PAIN: 0
SINUS PAIN: 0
TROUBLE SWALLOWING: 1
SINUS PRESSURE: 0
EYE DISCHARGE: 0
FACIAL SWELLING: 0
VOICE CHANGE: 0

## 2021-10-26 NOTE — ASSESSMENT & PLAN NOTE
Enlarging left-sided thyroid nodule with need for biopsy  Plan: The risks, benefits, and options were discussed with the patient. She is agreeable to proceed with an ultrasound-guided biopsy at [facility] under local anesthesia. I will call her the results once this has been completed.

## 2021-10-26 NOTE — PROGRESS NOTES
[facility]  Ms. Garza is a pleasant 82-year-old  female that was referred by Vickie Rosenthal due to problems with an enlarging left-sided thyroid nodule. She had been followed by Dr. Inder Lazo office due to a left-sided thyroid nodule. Her current ultrasound was reviewed and demonstrated a substantial growth on the left nodule compared to 6 months ago. The patient reports that she has had lots of problems with dysphagia that includes sporadic liquids and solids. She also reports of having sporadic episodes of SVT. Due to this enlarging nodule she believes this is all related to her thyroid nodule. She currently admits to wearing a CPAP machine that was diagnosed utilizing a home study. Patient denies a family history of thyroid cancer. She also reports that she has had a prior thyroid biopsy at [facility] that required 11 attempts to establish cytology. She is somewhat reluctant for biopsy based on this experience. Allergies: Patient has no known allergies. Current Outpatient Medications   Medication Sig Dispense Refill    SITagliptin-metFORMIN (JANUMET XR)  MG TB24 per extended release tablet Take 1 tablet by mouth daily      metoprolol succinate (TOPROL XL) 50 MG extended release tablet Take 50 mg by mouth daily       No current facility-administered medications for this visit. No past surgical history on file. No past medical history on file. No family history on file. Social History     Tobacco Use    Smoking status: Not on file   Substance Use Topics    Alcohol use: Not on file           REVIEW OF SYSTEMS:  all other systems reviewed and are negative  Review of Systems   Constitutional: Positive for fatigue. Negative for chills and fever. HENT: Positive for trouble swallowing.  Negative for congestion, dental problem, ear discharge, ear pain, facial swelling, hearing loss, nosebleeds, postnasal drip, rhinorrhea, sinus pressure, sinus pain, sneezing, sore throat, tinnitus and voice change. Eyes: Negative for pain and discharge. Neurological: Negative for dizziness and headaches. Comments:     PHYSICAL EXAM:    Temp 98.4 °F (36.9 °C)   Ht 5' 4\" (1.626 m)   Wt 289 lb (131.1 kg)   SpO2 97%   BMI 49.61 kg/m²   Body mass index is 49.61 kg/m². General Appearance: well developed  and well nourished  Head/ Face: normocephalic and atraumatic  Vocal Quality: good/ normal  Ears: Right Ear: External: external ears normal Otoscopy Ear Canal: canal clear Otoscopy TM: TM's normal and TM's mobile Left Ear: External: external ears normal Otoscopy Ear Canal: canal clear Otoscopy TM: TM's normal and TM's mobile  Hearing: grossly intact  Nose: see endoscopy report  Neck: supple and adenopathy none palpable  Thyroid: tender No and nodules Yes and  Right There were no obvious palpable nodularities to the left side. Assessment & Plan:    Problem List Items Addressed This Visit     Thyroid nodule - Primary      Enlarging left-sided thyroid nodule with need for biopsy  Plan: The risks, benefits, and options were discussed with the patient. She is agreeable to proceed with an ultrasound-guided biopsy at [facility] under local anesthesia. I will call her the results once this has been completed. Relevant Orders    US BIOPSY THYROID    NV LARYNGOSCOPY FLEXIBLE DIAGNOSTIC (Completed)    Obstructive sleep apnea syndrome     Obstructive sleep apnea based on history  Plan: Due to the patient having cardiac arrhythmias and gasping episodes, I would recommend an in-house sleep study for further evaluation. Based on her laryngoscopy, patient was noted to have no intrinsic compression that could be appreciated from the thyroid.          Morbid obesity (HCC)    Relevant Medications    SITagliptin-metFORMIN (JANUMET XR)  MG TB24 per extended release tablet          Orders Placed This Encounter   Procedures    US BIOPSY THYROID Standing Status:   Future     Standing Expiration Date:   10/26/2022     Scheduling Instructions:      Please schedule at [address] Specific Question:   Reason for exam:     Answer:   Enlarging left thyroid nodule noted from a comparison ultrasound 6 months ago    MO LARYNGOSCOPY FLEXIBLE DIAGNOSTIC     The nares were anesthetized with 4% lidocaine aerosol bilaterally. Each nare was individually evaluated where the patient was found to have engorged turbinates with no nasal polyps. The right nare was utilized for the full procedure. The posterior wall of the nasopharyngeal region demonstrated some postnasal drip with no masses or ulcerations noted. The scope was advanced to the oropharyngeal region where the patient was found to have some mild to moderate postnasal drip with normal swallowing appreciated. She was noted to have a very small ulceration erythema to the posterior pharynx that was felt to be consistent with some reflux. The scope was advanced to the epiglottis where the vocal cords were well visualized. The vocal cords appeared to be nonerythematous and nonedematous with no nodularities. The vocal cords were symmetrical and fully functional.  With extension of the tongue, no abnormalities were appreciated to posterior surface. During visualization of the oropharyngeal region and the epiglottic region, palpation of the thyroid was performed and demonstrated no obvious intrinsic compression. Of note she was noted to have a fairly small airway that was felt to be more related to obesity. No orders of the defined types were placed in this encounter. Electronically signed by Tammi Gotti PA-C on 10/26/21 at 4:38 PM CDT          Please note that this chart was generated using dragon dictation software. Although every effort was made to ensure the accuracy of this automated transcription, some errors in transcription may have occurred.

## 2021-10-26 NOTE — ASSESSMENT & PLAN NOTE
Obstructive sleep apnea based on history  Plan: Due to the patient having cardiac arrhythmias and gasping episodes, I would recommend an in-house sleep study for further evaluation. Based on her laryngoscopy, patient was noted to have no intrinsic compression that could be appreciated from the thyroid.

## 2021-11-22 ENCOUNTER — HOSPITAL ENCOUNTER (OUTPATIENT)
Dept: ULTRASOUND IMAGING | Age: 51
Discharge: HOME | End: 2021-11-22
Payer: COMMERCIAL

## 2021-11-22 DIAGNOSIS — E04.1 THYROID NODULE: ICD-10-CM

## 2021-11-22 PROCEDURE — 60100 BIOPSY OF THYROID: CPT

## 2021-11-22 PROCEDURE — 88177 CYTP FNA EVAL EA ADDL: CPT

## 2021-11-22 PROCEDURE — 88173 CYTOPATH EVAL FNA REPORT: CPT

## 2021-11-22 PROCEDURE — 88172 CYTP DX EVAL FNA 1ST EA SITE: CPT

## 2021-11-24 ENCOUNTER — TELEPHONE (OUTPATIENT)
Dept: ENT CLINIC | Age: 51
End: 2021-11-24

## 2021-11-24 DIAGNOSIS — E04.1 THYROID NODULE: Primary | ICD-10-CM

## 2021-11-24 NOTE — TELEPHONE ENCOUNTER
I called the pathology results to the patient regarding the biopsy of the thyroid nodule. This revealed benign follicular cells with no evidence of any malignancy. I recommended a 1 year follow-up ultrasound of the thyroid. She is to follow with me after the ultrasound for further recommendations.       Electronically signed by Mary Jackson PA-C on 11/24/21 at 4:22 PM CST

## 2024-03-20 ENCOUNTER — OFFICE VISIT (OUTPATIENT)
Dept: GASTROENTEROLOGY | Facility: CLINIC | Age: 54
End: 2024-03-20
Payer: COMMERCIAL

## 2024-03-20 VITALS
DIASTOLIC BLOOD PRESSURE: 78 MMHG | SYSTOLIC BLOOD PRESSURE: 140 MMHG | OXYGEN SATURATION: 98 % | TEMPERATURE: 97.1 F | BODY MASS INDEX: 44.05 KG/M2 | HEART RATE: 89 BPM | HEIGHT: 65 IN | WEIGHT: 264.4 LBS

## 2024-03-20 DIAGNOSIS — I10 HTN (HYPERTENSION), BENIGN: ICD-10-CM

## 2024-03-20 DIAGNOSIS — Z12.11 ENCOUNTER FOR SCREENING FOR MALIGNANT NEOPLASM OF COLON: Primary | ICD-10-CM

## 2024-03-20 DIAGNOSIS — E11.9 CONTROLLED TYPE 2 DIABETES MELLITUS WITHOUT COMPLICATION, WITHOUT LONG-TERM CURRENT USE OF INSULIN: ICD-10-CM

## 2024-03-20 RX ORDER — SODIUM PICOSULFATE, MAGNESIUM OXIDE, AND ANHYDROUS CITRIC ACID 12; 3.5; 1 G/175ML; G/175ML; MG/175ML
175 LIQUID ORAL TAKE AS DIRECTED
Qty: 350 ML | Refills: 0 | Status: SHIPPED | OUTPATIENT
Start: 2024-03-20

## 2024-03-20 NOTE — PROGRESS NOTES
Arminda Mccloud  1970      3/20/2024  Chief Complaint   Patient presents with    Colonoscopy     Colon screening     Subjective   HPI  Arminda Mccloud is a 54 y.o. female who presents as a referral for preventative maintenance. She has no complaints of nausea or vomiting. No change in bowels. No wt loss. No BRBPR. No melena. There is no family hx for colon cancer. No abdominal pain.  Past Medical History:   Diagnosis Date    Arthritis     Asthma     Chronic fatigue     Colon polyp 1990s    Depression     Diabetes mellitus     Generalized anxiety disorder     Goiter 05/22/2018    Hypercalcemia     Hyperparathyroidism     Hypothyroidism     Irritable bowel syndrome     Kidney stone     Lactose intolerance 1980s    Melanoma of back     Multinodular goiter     Obesity     Onychomycosis     Panic     Pelvis fracture 1996    did not require surgery - healed on its own    PONV (postoperative nausea and vomiting)     Sleep apnea     supposed to use machine but hasn't gotten it yet    Supraventricular tachycardia     Tendonitis     Vitamin D deficiency      Past Surgical History:   Procedure Laterality Date    CHOLECYSTECTOMY      COLONOSCOPY      Approx 5-10 yrs ago    ENDOSCOPY  09/28/2004    esophagus is normal-Hiatal Hernia    HAND SURGERY Right     JOINT REPLACEMENT Left     knee    TONSILLECTOMY AND ADENOIDECTOMY      UPPER GASTROINTESTINAL ENDOSCOPY      Approx 20 yrs ago    WIDE EXCISION LESION/MASS TRUNK Left 10/26/2020    Procedure: WIDE EXCISION MELANOMA LEFT BACK AND LEFT AXILLARY SENTINEL LYMPH NODE BIOPSY WITH SPY;  Surgeon: Markell Gresham MD;  Location: [facility] OR;  Service: General;  Laterality: Left;     Outpatient Medications Marked as Taking for the 3/20/24 encounter (Office Visit) with Karyn Vigil APRN   Medication Sig Dispense Refill    fluticasone (FLONASE) 50 MCG/ACT nasal spray 2 sprays into the nostril(s) as directed by provider Daily.      metoprolol succinate XL (TOPROL-XL) 25 MG  24 hr tablet Take 2 tablets by mouth 2 (Two) Times a Day.      Tirzepatide (Mounjaro) 15 MG/0.5ML solution pen-injector pen Inject 0.5 mL under the skin into the appropriate area as directed 1 (One) Time Per Week.       Allergies   Allergen Reactions    Lamisil [Terbinafine] Other (See Comments)     Other reaction(s): Other (See Comments)  Hair loss and mood change  Hair loss and mood change    Buspirone Rash     Social History     Socioeconomic History    Marital status: Single   Tobacco Use    Smoking status: Never    Smokeless tobacco: Never   Vaping Use    Vaping status: Never Used   Substance and Sexual Activity    Alcohol use: Never    Drug use: Never    Sexual activity: Never     Family History   Problem Relation Age of Onset    No Known Problems Mother     Hypertension Father     Breast cancer Paternal Aunt     Diabetes Paternal Grandmother     Colon cancer Neg Hx     Colon polyps Neg Hx      Health Maintenance   Topic Date Due    MAMMOGRAM  Never done    URINE MICROALBUMIN  Never done    COLORECTAL CANCER SCREENING  Never done    Pneumococcal Vaccine 0-64 (1 of 2 - PCV) Never done    DIABETIC EYE EXAM  Never done    Hepatitis B (1 of 3 - 19+ 3-dose series) Never done    TDAP/TD VACCINES (2 - Tdap) 08/10/2008    ANNUAL PHYSICAL  Never done    DIABETIC FOOT EXAM  Never done    PAP SMEAR  Never done    HEMOGLOBIN A1C  Never done    ZOSTER VACCINE (1 of 2) Never done    INFLUENZA VACCINE  Never done    COVID-19 Vaccine (1 - 2023-24 season) Never done    BMI FOLLOWUP  03/20/2025    HEPATITIS C SCREENING  Completed       REVIEW OF SYSTEMS  General: well appearing, no fever chills or sweats, no unexplained wt loss  HEENT: no acute visual or hearing disturbances  Cardiovascular: No chest pain or palpitations  Pulmonary: No shortness of breath, coughing, wheezing or hemoptysis  : No burning, urgency, hematuria, or dysuria  Musculoskeletal: No joint pain or stiffness  Peripheral: no edema  Skin: No lesions or  "rashes  Neuro: No dizziness, headaches, stroke, syncope  Endocrine: No hot or cold intolerances  Hematological: No blood dyscrasias    Objective   Vitals:    03/20/24 0929   BP: 140/78   BP Location: Left arm   Pulse: 89   Temp: 97.1 °F (36.2 °C)   TempSrc: Temporal   SpO2: 98%   Weight: 120 kg (264 lb 6.4 oz)   Height: 166.2 cm (65.43\")     Body mass index is 43.42 kg/m².  Class 3 Severe Obesity (BMI >=40). Obesity-related health conditions include the following: hypertension. Obesity is unchanged. BMI is is above average; BMI management plan is completed. We discussed portion control and increasing exercise.      PHYSICAL EXAM  General: age appropriate well nourished well appearing, no acute distress  Head: normocephalic and atraumatic  Global assessment-supple  Neck-No JVD noted, no lymphadenopathy  Pulmonary-clear to auscultation bilaterally, normal respiratory effort  Cardiovascular-normal rate and rhythm, normal heart sounds, S1 and S2 noted  Abdomen-soft, non tender, non distended, normal bowel sounds all 4 quadrants, no hepatosplenomegaly noted  Extremities-No clubbing cyanosis or edema  Neuro-Non focal, converses appropriately, awake, alert, oriented    Assessment & Plan     Diagnoses and all orders for this visit:    1. Encounter for screening for malignant neoplasm of colon (Primary)  -     Case Request; Standing  -     Case Request  -     Sod Picosulfate-Mag Ox-Cit Acd (Clenpiq) 10-3.5-12 MG-GM -GM/175ML solution; Take 175 mL by mouth Take As Directed.  Dispense: 350 mL; Refill: 0    2. Controlled type 2 diabetes mellitus without complication, without long-term current use of insulin  Comments:  Hold mounjaro for 7 days prior    3. HTN (hypertension), benign  Comments:  cont BP medication the day of procedure    Other orders  -     Implement Anesthesia Orders Day of Procedure; Standing  -     Follow Anesthesia Guidelines / Protocol; Future  -     Obtain Informed Consent; Future  -     Verify bowel prep " was successful; Standing  -     Obtain Informed Consent; Standing        COLONOSCOPY WITH ANESTHESIA (N/A)  Body mass index is 43.42 kg/m².    Patient instructions on prep prior to procedure provided to the patient.    All risks, benefits, alternatives, and indications of colonoscopy procedure have been discussed with the patient. Risks to include perforation of the colon requiring possible surgery or colostomy, risk of bleeding from biopsies or removal of colon tissue, possibility of missing a colon polyp or cancer, or adverse drug reaction.  Benefits to include the diagnosis and management of disease of the colon and rectum. Alternatives to include barium enema, radiographic evaluation, lab testing or no intervention. Pt verbalizes understanding and agrees.     Karyn Vigil, APRN  3/20/2024  10:02 CDT      IF YOU SMOKE OR USE TOBACCO PLEASE READ THE FOLLOWIN minutes reading provided    Why is smoking bad for me?  Smoking increases the risk of heart disease, lung disease, vascular disease, stroke, and cancer.     If you smoke, STOP!    If you would like more information on quitting smoking, please visit the [employer] website: [email or web address]   This link will provide additional resources including the QUIT line and the Beat the Pack support groups.     For more information:    Quit Now [place]  -QUIT-NOW  [email or web address]

## 2024-05-06 ENCOUNTER — ANESTHESIA (OUTPATIENT)
Dept: GASTROENTEROLOGY | Facility: HOSPITAL | Age: 54
End: 2024-05-06
Payer: COMMERCIAL

## 2024-05-06 ENCOUNTER — ANESTHESIA EVENT (OUTPATIENT)
Dept: GASTROENTEROLOGY | Facility: HOSPITAL | Age: 54
End: 2024-05-06
Payer: COMMERCIAL

## 2024-05-06 ENCOUNTER — HOSPITAL ENCOUNTER (OUTPATIENT)
Facility: HOSPITAL | Age: 54
Setting detail: HOSPITAL OUTPATIENT SURGERY
Discharge: HOME | End: 2024-05-06
Attending: INTERNAL MEDICINE | Admitting: INTERNAL MEDICINE
Payer: COMMERCIAL

## 2024-05-06 VITALS
RESPIRATION RATE: 20 BRPM | OXYGEN SATURATION: 97 % | SYSTOLIC BLOOD PRESSURE: 112 MMHG | HEART RATE: 77 BPM | DIASTOLIC BLOOD PRESSURE: 61 MMHG

## 2024-05-06 DIAGNOSIS — Z12.11 ENCOUNTER FOR SCREENING FOR MALIGNANT NEOPLASM OF COLON: ICD-10-CM

## 2024-05-06 LAB
B-HCG UR QL: NEGATIVE
GLUCOSE BLDC GLUCOMTR-MCNC: 130 MG/DL (ref 70–130)

## 2024-05-06 PROCEDURE — 88305 TISSUE EXAM BY PATHOLOGIST: CPT | Performed by: INTERNAL MEDICINE

## 2024-05-06 PROCEDURE — 82948 REAGENT STRIP/BLOOD GLUCOSE: CPT

## 2024-05-06 PROCEDURE — 81025 URINE PREGNANCY TEST: CPT | Performed by: NURSE ANESTHETIST, CERTIFIED REGISTERED

## 2024-05-06 PROCEDURE — 25810000003 SODIUM CHLORIDE 0.9 % SOLUTION: Performed by: NURSE ANESTHETIST, CERTIFIED REGISTERED

## 2024-05-06 PROCEDURE — 45385 COLONOSCOPY W/LESION REMOVAL: CPT | Performed by: INTERNAL MEDICINE

## 2024-05-06 PROCEDURE — 25010000002 PROPOFOL 10 MG/ML EMULSION: Performed by: NURSE ANESTHETIST, CERTIFIED REGISTERED

## 2024-05-06 RX ORDER — SODIUM CHLORIDE 9 MG/ML
100 INJECTION, SOLUTION INTRAVENOUS CONTINUOUS
Status: DISCONTINUED | OUTPATIENT
Start: 2024-05-06 | End: 2024-05-06 | Stop reason: HOSPADM

## 2024-05-06 RX ORDER — SODIUM CHLORIDE 0.9 % (FLUSH) 0.9 %
10 SYRINGE (ML) INJECTION EVERY 12 HOURS SCHEDULED
Status: DISCONTINUED | OUTPATIENT
Start: 2024-05-06 | End: 2024-05-06 | Stop reason: HOSPADM

## 2024-05-06 RX ORDER — LIDOCAINE HYDROCHLORIDE 10 MG/ML
0.5 INJECTION, SOLUTION EPIDURAL; INFILTRATION; INTRACAUDAL; PERINEURAL ONCE AS NEEDED
Status: DISCONTINUED | OUTPATIENT
Start: 2024-05-06 | End: 2024-05-06 | Stop reason: HOSPADM

## 2024-05-06 RX ORDER — SODIUM CHLORIDE 9 MG/ML
500 INJECTION, SOLUTION INTRAVENOUS CONTINUOUS PRN
Status: DISCONTINUED | OUTPATIENT
Start: 2024-05-06 | End: 2024-05-06 | Stop reason: HOSPADM

## 2024-05-06 RX ORDER — SODIUM CHLORIDE 0.9 % (FLUSH) 0.9 %
10 SYRINGE (ML) INJECTION AS NEEDED
Status: DISCONTINUED | OUTPATIENT
Start: 2024-05-06 | End: 2024-05-06 | Stop reason: HOSPADM

## 2024-05-06 RX ORDER — LIDOCAINE HYDROCHLORIDE 20 MG/ML
INJECTION, SOLUTION EPIDURAL; INFILTRATION; INTRACAUDAL; PERINEURAL AS NEEDED
Status: DISCONTINUED | OUTPATIENT
Start: 2024-05-06 | End: 2024-05-06 | Stop reason: SURG

## 2024-05-06 RX ORDER — PROPOFOL 10 MG/ML
VIAL (ML) INTRAVENOUS AS NEEDED
Status: DISCONTINUED | OUTPATIENT
Start: 2024-05-06 | End: 2024-05-06 | Stop reason: SURG

## 2024-05-06 RX ORDER — SODIUM CHLORIDE 9 MG/ML
40 INJECTION, SOLUTION INTRAVENOUS AS NEEDED
Status: DISCONTINUED | OUTPATIENT
Start: 2024-05-06 | End: 2024-05-06 | Stop reason: HOSPADM

## 2024-05-06 RX ORDER — SEMAGLUTIDE 1.34 MG/ML
INJECTION, SOLUTION SUBCUTANEOUS WEEKLY
COMMUNITY

## 2024-05-06 RX ORDER — DAPAGLIFLOZIN AND METFORMIN HYDROCHLORIDE 5; 1000 MG/1; MG/1
1 TABLET, FILM COATED, EXTENDED RELEASE ORAL DAILY
COMMUNITY

## 2024-05-06 RX ADMIN — LIDOCAINE HYDROCHLORIDE 100 MG: 20 INJECTION, SOLUTION EPIDURAL; INFILTRATION; INTRACAUDAL; PERINEURAL at 08:20

## 2024-05-06 RX ADMIN — PROPOFOL INJECTABLE EMULSION 425 MG: 10 INJECTION, EMULSION INTRAVENOUS at 08:20

## 2024-05-06 RX ADMIN — SODIUM CHLORIDE 500 ML: 9 INJECTION, SOLUTION INTRAVENOUS at 08:03

## 2024-05-07 LAB
CYTO UR: NORMAL
LAB AP CASE REPORT: NORMAL
Lab: NORMAL
PATH REPORT.FINAL DX SPEC: NORMAL
PATH REPORT.GROSS SPEC: NORMAL

## 2024-05-08 PROBLEM — Z86.010 HISTORY OF ADENOMATOUS POLYP OF COLON: Status: ACTIVE | Noted: 2024-03-20

## 2024-05-08 PROBLEM — Z86.0101 HISTORY OF ADENOMATOUS POLYP OF COLON: Status: ACTIVE | Noted: 2024-03-20

## 2024-07-12 NOTE — PROGRESS NOTES
[facility] - PODIATRY    Today's Date: 07/16/2024     Patient Name: Arminda Mccloud  MRN: [id number]  CSN: [id number]  PCP: Zelalem Lindo MD  Referring Provider: Elodia Mcmahon PA    SUBJECTIVE     Chief Complaint   Patient presents with    Establish Care     Zelalem Lindo MD 02/19/2024 DIABETIC FOOT CARE- NOTES 02/19/24- NO IMAGING- HX OF SX FOR INGROWN TOENAIL >20YRS    Diabetes     102mg/dL BG     HPI: Arminda Mccloud, a 54 y.o.female, comes to clinic as a(n) new patient presenting for diabetic foot exam, complaining of toenail/callus issues, and complaining of foot rash . Patient has h/o arthritis, asthma, depression, DM2, HTN, anxiety, goiter, hyperparathyroid, hypothyroid, IBS, Melanoma, Obesity, sleep apnea . Patient is NIDDM with last stated BG level of 102mg/dl.  Denies numbness in feet.  Denies open wounds or sores. Notes that her right hallux nail has been thick and irregular for ~20 years. She had a reaction to PO terbinafine. Notes a rash to the bottom of her right foot for a few years. Notes occasional small pustules that form.  Denies pain. Denies previous treatment. Denies any constitutional symptoms. No other pedal complaints at this time.    Past Medical History:   Diagnosis Date    Arthritis     Asthma     Bunion     Left foot    Callus     Left and right great toe    Chronic fatigue     Colon polyp 1990s    Depression     Diabetes mellitus     Essential hypertension 10/25/2023    Generalized anxiety disorder     Goiter 05/22/2018    Hypercalcemia     Hyperparathyroidism     Hypothyroidism     Ingrown toenail     Hx of    Irritable bowel syndrome     Kidney stone     Lactose intolerance 1980s    Melanoma of back     Multinodular goiter     Obesity     Onychomycosis     Panic     Pelvis fracture 1996    did not require surgery - healed on its own    Plantar fasciitis     Hx of,.corrected approx 10yrs ago    PONV (postoperative nausea and vomiting)     Sleep apnea      supposed to use machine but hasn't gotten it yet    Supraventricular tachycardia     Tendonitis     Vitamin D deficiency      Past Surgical History:   Procedure Laterality Date    CHOLECYSTECTOMY      COLONOSCOPY      Approx 5-10 yrs ago    COLONOSCOPY N/A 05/06/2024    Procedure: COLONOSCOPY WITH ANESTHESIA;  Surgeon: Melissa Smiley MD;  Location: [facility] ENDOSCOPY;  Service: Gastroenterology;  Laterality: N/A;  preop; screening   postop polyps   PCP Polo Lindo    ENDOSCOPY  09/28/2004    esophagus is normal-Hiatal Hernia    HAND SURGERY Right     JOINT REPLACEMENT Left     knee    TONSILLECTOMY AND ADENOIDECTOMY      UPPER GASTROINTESTINAL ENDOSCOPY      Approx 20 yrs ago    WIDE EXCISION LESION/MASS TRUNK Left 10/26/2020    Procedure: WIDE EXCISION MELANOMA LEFT BACK AND LEFT AXILLARY SENTINEL LYMPH NODE BIOPSY WITH SPY;  Surgeon: Markell Gresham MD;  Location: [facility] OR;  Service: General;  Laterality: Left;     Family History   Problem Relation Age of Onset    No Known Problems Mother     Hypertension Father     Diabetes Father     Breast cancer Paternal Aunt     Diabetes Paternal Grandmother     Colon cancer Neg Hx     Colon polyps Neg Hx      Social History     Socioeconomic History    Marital status: Single   Tobacco Use    Smoking status: Never    Smokeless tobacco: Never   Vaping Use    Vaping status: Never Used   Substance and Sexual Activity    Alcohol use: Never    Drug use: Never    Sexual activity: Never     Allergies   Allergen Reactions    Lamisil [Terbinafine] Other (See Comments)     Other reaction(s): Other (See Comments)  Hair loss and mood change  Hair loss and mood change    Buspirone Rash     Current Outpatient Medications   Medication Sig Dispense Refill    fluticasone (FLONASE) 50 MCG/ACT nasal spray 2 sprays into the nostril(s) as directed by provider Daily.      Semaglutide,0.25 or 0.5MG/DOS, (Ozempic, 0.25 or 0.5 MG/DOSE,) 2 MG/1.5ML solution pen-injector Inject  under the skin  into the appropriate area as directed 1 (One) Time Per Week.      Tirzepatide (Mounjaro) 15 MG/0.5ML solution pen-injector pen Inject 0.5 mL under the skin into the appropriate area as directed 1 (One) Time Per Week.      clotrimazole-betamethasone (LOTRISONE) 1-0.05 % cream Apply 1 Application topically to the appropriate area as directed 2 (Two) Times a Day. Apply to affected area twice daily 45 g 3    metoprolol succinate XL (TOPROL-XL) 25 MG 24 hr tablet Take 2 tablets by mouth 2 (Two) Times a Day.       No current facility-administered medications for this visit.     Review of Systems   Constitutional:  Negative for chills and fever.   HENT:  Negative for congestion.    Respiratory:  Negative for shortness of breath.    Cardiovascular:  Negative for chest pain and leg swelling.   Gastrointestinal:  Negative for constipation, diarrhea, nausea and vomiting.   Musculoskeletal:         Foot pain   Skin:  Positive for rash. Negative for wound.   Neurological:  Negative for numbness.       OBJECTIVE     Vitals:    07/16/24 0851   BP: 138/84   Pulse: 89   SpO2: 99%       PHYSICAL EXAM  GEN:   Accompanied by none.     Foot/Ankle Exam    GENERAL  Diabetic foot exam performed    Appearance:  appears stated age and obese  Orientation:  AAOx3  Affect:  appropriate  Gait:  unimpaired  Assistance:  independent  Right shoe gear: casual shoe  Left shoe gear: casual shoe    VASCULAR     Right Foot Vascularity   Dorsalis pedis:  2+  Posterior tibial:  2+  Skin temperature:  warm  Edema grading:  Trace  CFT:  3  Pedal hair growth:  Present  Varicosities:  none     Left Foot Vascularity   Dorsalis pedis:  2+  Posterior tibial:  2+  Skin temperature:  warm  Edema grading:  Trace  CFT:  3  Pedal hair growth:  Present  Varicosities:  none     NEUROLOGIC     Right Foot Neurologic   Normal sensation    Light touch sensation: normal  Vibratory sensation: normal  Hot/Cold sensation: normal  Protective Sensation using [place]ip[place]-Alejandro  Monofilament:   Sites intact: 10  Sites tested: 10     Left Foot Neurologic   Normal sensation    Light touch sensation: normal  Vibratory sensation: normal  Hot/Cold sensation:  normal  Protective Sensation using [place]-Alejandro Monofilament:   Sites intact: 10  Sites tested: 10    MUSCULOSKELETAL     Right Foot Musculoskeletal   Ecchymosis:  none  Tenderness:  none    Arch:  Normal  Hammertoe:  Second toe  Hallux valgus: Yes       Left Foot Musculoskeletal   Ecchymosis:  none  Tenderness:  none  Arch:  Normal  Hammertoe:  Second toe  Hallux valgus: Yes      MUSCLE STRENGTH     Right Foot Muscle Strength   Foot dorsiflexion:  5  Foot plantar flexion:  5  Foot inversion:  5  Foot eversion:  5     Left Foot Muscle Strength   Foot dorsiflexion:  5  Foot plantar flexion:  5  Foot inversion:  5  Foot eversion:  5    RANGE OF MOTION     Right Foot Range of Motion   Foot and ankle ROM within normal limits       Left Foot Range of Motion   Foot and ankle ROM within normal limits      DERMATOLOGIC      Right Foot Dermatologic   Skin  Positive for skin changes (plantar midfoot dermatitis).   Nails  1.  Positive for elongated, abnormal thickness, subungual debris and dystrophic nail.  2.  Normal.  3.  Normal.  4.  Normal.  5.  Normal.     Left Foot Dermatologic   Skin  Left foot skin is intact.   Nails  1.  Normal.  2.  Normal.  3.  Normal.  4.  Normal.  5.  Normal.      RADIOLOGY/NUCLEAR:  No results found.    LABORATORY/CULTURE RESULTS:      PATHOLOGY RESULTS:       ASSESSMENT/PLAN     Diagnoses and all orders for this visit:    1. Dystrophic nail (Primary)    2. Controlled type 2 diabetes mellitus without complication, without long-term current use of insulin    3. Dermatitis of right foot    4. Encounter for diabetic foot exam    Other orders  -     clotrimazole-betamethasone (LOTRISONE) 1-0.05 % cream; Apply 1 Application topically to the appropriate area as directed 2 (Two) Times a Day. Apply to affected area twice  daily  Dispense: 45 g; Refill: 3      Comprehensive lower extremity examination and evaluation was performed.  Discussed findings and treatment plan including risks, benefits, and treatment options with patient in detail. Patient agreed with treatment plan.  DFE performed  Patient may maintain nails and calluses at home utilizing emery board or pumice stone between visits as needed  Reviewed at home diabetic foot care including daily foot checks  Apply antifungal/steroid cream as prescribed.    An After Visit Summary was printed and given to the patient at discharge, including (if requested) any available informative/educational handouts regarding diagnosis, treatment, or medications. All questions were answered to patient/family satisfaction. Should symptoms fail to improve or worsen they agree to call or return to clinic or to go to the Emergency Department. Discussed the importance of following up with any needed screening tests/labs/specialist appointments and any requested follow-up recommended by me today. Importance of maintaining follow-up discussed and patient accepts that missed appointments can delay diagnosis and potentially lead to worsening of conditions.  Return in about 1 year (around 7/16/2025) for Diabetic Foot Exam, Follow-up with Podiatry APRN., or sooner if acute issues arise.      This document has been electronically signed by Shimon Gutierres DPM on July 16, 2024 09:20 CDT

## 2024-07-15 ENCOUNTER — TELEPHONE (OUTPATIENT)
Age: 54
End: 2024-07-15
Payer: COMMERCIAL

## 2024-07-16 ENCOUNTER — OFFICE VISIT (OUTPATIENT)
Age: 54
End: 2024-07-16
Payer: COMMERCIAL

## 2024-07-16 VITALS
HEIGHT: 65 IN | BODY MASS INDEX: 45.65 KG/M2 | WEIGHT: 274 LBS | DIASTOLIC BLOOD PRESSURE: 84 MMHG | HEART RATE: 89 BPM | OXYGEN SATURATION: 99 % | SYSTOLIC BLOOD PRESSURE: 138 MMHG

## 2024-07-16 DIAGNOSIS — L30.9 DERMATITIS OF RIGHT FOOT: ICD-10-CM

## 2024-07-16 DIAGNOSIS — E11.9 CONTROLLED TYPE 2 DIABETES MELLITUS WITHOUT COMPLICATION, WITHOUT LONG-TERM CURRENT USE OF INSULIN: ICD-10-CM

## 2024-07-16 DIAGNOSIS — E66.01 MORBID OBESITY: ICD-10-CM

## 2024-07-16 DIAGNOSIS — E11.9 ENCOUNTER FOR DIABETIC FOOT EXAM: ICD-10-CM

## 2024-07-16 DIAGNOSIS — L60.3 DYSTROPHIC NAIL: Primary | ICD-10-CM

## 2024-07-16 PROBLEM — G47.33 OBSTRUCTIVE SLEEP APNEA SYNDROME: Status: ACTIVE | Noted: 2021-10-26

## 2024-07-16 PROBLEM — E55.9 VITAMIN D DEFICIENCY: Status: ACTIVE | Noted: 2023-10-25

## 2024-07-16 PROBLEM — D47.2 SMOLDERING MYELOMA: Status: ACTIVE | Noted: 2024-07-16

## 2024-07-16 PROBLEM — K58.0 IRRITABLE BOWEL SYNDROME WITH DIARRHEA: Status: ACTIVE | Noted: 2024-07-16

## 2024-07-16 PROBLEM — I10 ESSENTIAL HYPERTENSION: Status: ACTIVE | Noted: 2023-10-25

## 2024-07-16 RX ORDER — CLOTRIMAZOLE AND BETAMETHASONE DIPROPIONATE 10; .64 MG/G; MG/G
1 CREAM TOPICAL 2 TIMES DAILY
Qty: 45 G | Refills: 3 | Status: SHIPPED | OUTPATIENT
Start: 2024-07-16

## 2025-01-26 ENCOUNTER — HOSPITAL ENCOUNTER (OUTPATIENT)
Facility: HOSPITAL | Age: 55
Setting detail: OBSERVATION
Discharge: HOME | End: 2025-01-26
Attending: FAMILY MEDICINE | Admitting: INTERNAL MEDICINE
Payer: COMMERCIAL

## 2025-01-26 ENCOUNTER — PREP FOR SURGERY (OUTPATIENT)
Dept: OTHER | Facility: HOSPITAL | Age: 55
End: 2025-01-26
Payer: COMMERCIAL

## 2025-01-26 ENCOUNTER — APPOINTMENT (OUTPATIENT)
Dept: CARDIOLOGY | Facility: HOSPITAL | Age: 55
End: 2025-01-26
Payer: COMMERCIAL

## 2025-01-26 VITALS
BODY MASS INDEX: 45.58 KG/M2 | OXYGEN SATURATION: 97 % | WEIGHT: 267 LBS | HEART RATE: 77 BPM | DIASTOLIC BLOOD PRESSURE: 86 MMHG | TEMPERATURE: 97.8 F | SYSTOLIC BLOOD PRESSURE: 120 MMHG | HEIGHT: 64 IN | RESPIRATION RATE: 18 BRPM

## 2025-01-26 DIAGNOSIS — I47.10 SUSTAINED SVT: Primary | ICD-10-CM

## 2025-01-26 LAB
ALBUMIN SERPL-MCNC: 3.8 G/DL (ref 3.5–5.2)
ALBUMIN/GLOB SERPL: 1.1 G/DL
ALP SERPL-CCNC: 71 U/L (ref 39–117)
ALT SERPL W P-5'-P-CCNC: 27 U/L (ref 1–33)
ANION GAP SERPL CALCULATED.3IONS-SCNC: 9 MMOL/L (ref 5–15)
ASCENDING AORTA: 3.6 CM
AST SERPL-CCNC: 19 U/L (ref 1–32)
AV MEAN PRESS GRAD SYS DOP V1V2: 3.7 MMHG
AV VMAX SYS DOP: 137.3 CM/SEC
BH CV ECHO MEAS - AO MAX PG: 7.5 MMHG
BH CV ECHO MEAS - AO ROOT DIAM: 3.4 CM
BH CV ECHO MEAS - AO V2 VTI: 32.9 CM
BH CV ECHO MEAS - AVA(I,D): 4.1 CM2
BH CV ECHO MEAS - EDV(CUBED): 147.4 ML
BH CV ECHO MEAS - EDV(MOD-SP2): 130.9 ML
BH CV ECHO MEAS - EDV(MOD-SP4): 121.4 ML
BH CV ECHO MEAS - EF(MOD-SP2): 57.9 %
BH CV ECHO MEAS - EF(MOD-SP4): 65.1 %
BH CV ECHO MEAS - ESV(CUBED): 27.4 ML
BH CV ECHO MEAS - ESV(MOD-SP2): 55.2 ML
BH CV ECHO MEAS - ESV(MOD-SP4): 42.4 ML
BH CV ECHO MEAS - FS: 43 %
BH CV ECHO MEAS - IVS/LVPW: 0.99 CM
BH CV ECHO MEAS - IVSD: 0.91 CM
BH CV ECHO MEAS - LA DIMENSION: 4 CM
BH CV ECHO MEAS - LAT PEAK E' VEL: 9.8 CM/SEC
BH CV ECHO MEAS - LV DIASTOLIC VOL/BSA (35-75): 54.9 CM2
BH CV ECHO MEAS - LV MASS(C)D: 178.1 GRAMS
BH CV ECHO MEAS - LV MAX PG: 6.3 MMHG
BH CV ECHO MEAS - LV MEAN PG: 3.3 MMHG
BH CV ECHO MEAS - LV SYSTOLIC VOL/BSA (12-30): 19.2 CM2
BH CV ECHO MEAS - LV V1 MAX: 125.1 CM/SEC
BH CV ECHO MEAS - LV V1 VTI: 31.2 CM
BH CV ECHO MEAS - LVIDD: 5.3 CM
BH CV ECHO MEAS - LVIDS: 3 CM
BH CV ECHO MEAS - LVOT AREA: 4.4 CM2
BH CV ECHO MEAS - LVOT DIAM: 2.36 CM
BH CV ECHO MEAS - LVPWD: 0.92 CM
BH CV ECHO MEAS - MED PEAK E' VEL: 9.2 CM/SEC
BH CV ECHO MEAS - MV A MAX VEL: 89.3 CM/SEC
BH CV ECHO MEAS - MV DEC SLOPE: 430.5 CM/SEC2
BH CV ECHO MEAS - MV DEC TIME: 0.23 SEC
BH CV ECHO MEAS - MV E MAX VEL: 96.9 CM/SEC
BH CV ECHO MEAS - MV E/A: 1.08
BH CV ECHO MEAS - MV MAX PG: 3.7 MMHG
BH CV ECHO MEAS - MV MEAN PG: 1.97 MMHG
BH CV ECHO MEAS - MV V2 VTI: 36 CM
BH CV ECHO MEAS - MVA(VTI): 3.8 CM2
BH CV ECHO MEAS - PA V2 MAX: 93.5 CM/SEC
BH CV ECHO MEAS - PULM A REVS DUR: 0.14 SEC
BH CV ECHO MEAS - PULM A REVS VEL: 25.7 CM/SEC
BH CV ECHO MEAS - RAP SYSTOLE: 3 MMHG
BH CV ECHO MEAS - RV MAX PG: 2.8 MMHG
BH CV ECHO MEAS - RV V1 MAX: 83.8 CM/SEC
BH CV ECHO MEAS - RV V1 VTI: 22.3 CM
BH CV ECHO MEAS - RVDD: 3.8 CM
BH CV ECHO MEAS - RVSP: 16.6 MMHG
BH CV ECHO MEAS - SV(LVOT): 136.1 ML
BH CV ECHO MEAS - SV(MOD-SP2): 75.8 ML
BH CV ECHO MEAS - SV(MOD-SP4): 79 ML
BH CV ECHO MEAS - SVI(LVOT): 61.5 ML/M2
BH CV ECHO MEAS - SVI(MOD-SP2): 34.3 ML/M2
BH CV ECHO MEAS - SVI(MOD-SP4): 35.7 ML/M2
BH CV ECHO MEAS - TAPSE (>1.6): 3.6 CM
BH CV ECHO MEAS - TR MAX PG: 13.6 MMHG
BH CV ECHO MEAS - TR MAX VEL: 184.6 CM/SEC
BH CV ECHO MEASUREMENTS AVERAGE E/E' RATIO: 10.2
BH CV XLRA - RV BASE: 4.6 CM
BH CV XLRA - RV LENGTH: 7.9 CM
BH CV XLRA - RV MID: 3.3 CM
BH CV XLRA - TDI S': 17 CM/SEC
BILIRUB SERPL-MCNC: 0.3 MG/DL (ref 0–1.2)
BUN SERPL-MCNC: 7 MG/DL (ref 6–20)
BUN/CREAT SERPL: 13.7 (ref 7–25)
CALCIUM SPEC-SCNC: 8.7 MG/DL (ref 8.6–10.5)
CHLORIDE SERPL-SCNC: 104 MMOL/L (ref 98–107)
CO2 SERPL-SCNC: 26 MMOL/L (ref 22–29)
CREAT SERPL-MCNC: 0.51 MG/DL (ref 0.57–1)
DEPRECATED RDW RBC AUTO: 40.8 FL (ref 37–54)
EGFRCR SERPLBLD CKD-EPI 2021: 111.8 ML/MIN/1.73
ERYTHROCYTE [DISTWIDTH] IN BLOOD BY AUTOMATED COUNT: 13 % (ref 12.3–15.4)
GLOBULIN UR ELPH-MCNC: 3.6 GM/DL
GLUCOSE SERPL-MCNC: 97 MG/DL (ref 65–99)
HCT VFR BLD AUTO: 43.6 % (ref 34–46.6)
HGB BLD-MCNC: 14.3 G/DL (ref 12–15.9)
IVRT: 91 MS
LEFT ATRIUM VOLUME INDEX: 25 ML/M2
LEFT ATRIUM VOLUME: 50 ML
LV EF BIPLANE MOD: 61 %
MAGNESIUM SERPL-MCNC: 2.1 MG/DL (ref 1.6–2.6)
MCH RBC QN AUTO: 28.5 PG (ref 26.6–33)
MCHC RBC AUTO-ENTMCNC: 32.8 G/DL (ref 31.5–35.7)
MCV RBC AUTO: 87 FL (ref 79–97)
PLATELET # BLD AUTO: 311 10*3/MM3 (ref 140–450)
PMV BLD AUTO: 9.5 FL (ref 6–12)
POTASSIUM SERPL-SCNC: 3.8 MMOL/L (ref 3.5–5.2)
PROT SERPL-MCNC: 7.4 G/DL (ref 6–8.5)
RBC # BLD AUTO: 5.01 10*6/MM3 (ref 3.77–5.28)
SODIUM SERPL-SCNC: 139 MMOL/L (ref 136–145)
TSH SERPL DL<=0.05 MIU/L-ACNC: 0.36 UIU/ML (ref 0.27–4.2)
WBC NRBC COR # BLD AUTO: 10.96 10*3/MM3 (ref 3.4–10.8)

## 2025-01-26 PROCEDURE — 25510000001 PERFLUTREN 6.52 MG/ML SUSPENSION: Performed by: FAMILY MEDICINE

## 2025-01-26 PROCEDURE — 99204 OFFICE O/P NEW MOD 45 MIN: CPT | Performed by: STUDENT IN AN ORGANIZED HEALTH CARE EDUCATION/TRAINING PROGRAM

## 2025-01-26 PROCEDURE — G0378 HOSPITAL OBSERVATION PER HR: HCPCS

## 2025-01-26 PROCEDURE — 93306 TTE W/DOPPLER COMPLETE: CPT | Performed by: INTERNAL MEDICINE

## 2025-01-26 PROCEDURE — 93005 ELECTROCARDIOGRAM TRACING: CPT | Performed by: INTERNAL MEDICINE

## 2025-01-26 PROCEDURE — 80050 GENERAL HEALTH PANEL: CPT | Performed by: FAMILY MEDICINE

## 2025-01-26 PROCEDURE — G0379 DIRECT REFER HOSPITAL OBSERV: HCPCS

## 2025-01-26 PROCEDURE — 93010 ELECTROCARDIOGRAM REPORT: CPT | Performed by: INTERNAL MEDICINE

## 2025-01-26 PROCEDURE — 83735 ASSAY OF MAGNESIUM: CPT | Performed by: NURSE PRACTITIONER

## 2025-01-26 PROCEDURE — 99214 OFFICE O/P EST MOD 30 MIN: CPT | Performed by: NURSE PRACTITIONER

## 2025-01-26 PROCEDURE — 93306 TTE W/DOPPLER COMPLETE: CPT

## 2025-01-26 RX ORDER — SODIUM CHLORIDE 0.9 % (FLUSH) 0.9 %
10 SYRINGE (ML) INJECTION AS NEEDED
OUTPATIENT
Start: 2025-01-26

## 2025-01-26 RX ORDER — SODIUM CHLORIDE 0.9 % (FLUSH) 0.9 %
10 SYRINGE (ML) INJECTION EVERY 12 HOURS SCHEDULED
Status: DISCONTINUED | OUTPATIENT
Start: 2025-01-26 | End: 2025-01-26 | Stop reason: HOSPADM

## 2025-01-26 RX ORDER — ACETAMINOPHEN 325 MG/1
650 TABLET ORAL EVERY 4 HOURS PRN
Status: DISCONTINUED | OUTPATIENT
Start: 2025-01-26 | End: 2025-01-26 | Stop reason: HOSPADM

## 2025-01-26 RX ORDER — AMOXICILLIN 250 MG
2 CAPSULE ORAL 2 TIMES DAILY PRN
Status: DISCONTINUED | OUTPATIENT
Start: 2025-01-26 | End: 2025-01-26 | Stop reason: HOSPADM

## 2025-01-26 RX ORDER — ACETAMINOPHEN 650 MG/1
650 SUPPOSITORY RECTAL EVERY 4 HOURS PRN
Status: DISCONTINUED | OUTPATIENT
Start: 2025-01-26 | End: 2025-01-26 | Stop reason: HOSPADM

## 2025-01-26 RX ORDER — METOPROLOL SUCCINATE 50 MG/1
50 TABLET, EXTENDED RELEASE ORAL DAILY
COMMUNITY

## 2025-01-26 RX ORDER — SODIUM CHLORIDE 0.9 % (FLUSH) 0.9 %
10 SYRINGE (ML) INJECTION AS NEEDED
Status: DISCONTINUED | OUTPATIENT
Start: 2025-01-26 | End: 2025-01-26 | Stop reason: HOSPADM

## 2025-01-26 RX ORDER — ENOXAPARIN SODIUM 100 MG/ML
40 INJECTION SUBCUTANEOUS EVERY 12 HOURS
Status: DISCONTINUED | OUTPATIENT
Start: 2025-01-26 | End: 2025-01-26 | Stop reason: HOSPADM

## 2025-01-26 RX ORDER — NITROGLYCERIN 0.4 MG/1
0.4 TABLET SUBLINGUAL
Status: DISCONTINUED | OUTPATIENT
Start: 2025-01-26 | End: 2025-01-26 | Stop reason: HOSPADM

## 2025-01-26 RX ORDER — BISACODYL 10 MG
10 SUPPOSITORY, RECTAL RECTAL DAILY PRN
Status: DISCONTINUED | OUTPATIENT
Start: 2025-01-26 | End: 2025-01-26 | Stop reason: HOSPADM

## 2025-01-26 RX ORDER — BISACODYL 5 MG/1
5 TABLET, DELAYED RELEASE ORAL DAILY PRN
Status: DISCONTINUED | OUTPATIENT
Start: 2025-01-26 | End: 2025-01-26 | Stop reason: HOSPADM

## 2025-01-26 RX ORDER — SODIUM CHLORIDE 9 MG/ML
40 INJECTION, SOLUTION INTRAVENOUS AS NEEDED
Status: DISCONTINUED | OUTPATIENT
Start: 2025-01-26 | End: 2025-01-26 | Stop reason: HOSPADM

## 2025-01-26 RX ORDER — POLYETHYLENE GLYCOL 3350 17 G/17G
17 POWDER, FOR SOLUTION ORAL DAILY PRN
Status: DISCONTINUED | OUTPATIENT
Start: 2025-01-26 | End: 2025-01-26 | Stop reason: HOSPADM

## 2025-01-26 RX ORDER — SODIUM CHLORIDE 9 MG/ML
40 INJECTION, SOLUTION INTRAVENOUS AS NEEDED
OUTPATIENT
Start: 2025-01-26

## 2025-01-26 RX ORDER — METOPROLOL SUCCINATE 50 MG/1
50 TABLET, EXTENDED RELEASE ORAL
Status: DISCONTINUED | OUTPATIENT
Start: 2025-01-26 | End: 2025-01-26

## 2025-01-26 RX ORDER — ALBUTEROL SULFATE 90 UG/1
2 INHALANT RESPIRATORY (INHALATION) EVERY 4 HOURS PRN
COMMUNITY

## 2025-01-26 RX ORDER — METOPROLOL SUCCINATE 50 MG/1
50 TABLET, EXTENDED RELEASE ORAL DAILY
Status: DISCONTINUED | OUTPATIENT
Start: 2025-01-26 | End: 2025-01-26 | Stop reason: HOSPADM

## 2025-01-26 RX ORDER — SODIUM CHLORIDE 0.9 % (FLUSH) 0.9 %
10 SYRINGE (ML) INJECTION EVERY 12 HOURS SCHEDULED
OUTPATIENT
Start: 2025-01-26

## 2025-01-26 RX ORDER — FLUOXETINE 40 MG/1
40 CAPSULE ORAL DAILY
Status: ON HOLD | COMMUNITY
End: 2025-01-31

## 2025-01-26 RX ORDER — ACETAMINOPHEN 160 MG/5ML
650 SOLUTION ORAL EVERY 4 HOURS PRN
Status: DISCONTINUED | OUTPATIENT
Start: 2025-01-26 | End: 2025-01-26 | Stop reason: HOSPADM

## 2025-01-26 RX ADMIN — METOPROLOL SUCCINATE 50 MG: 50 TABLET, FILM COATED, EXTENDED RELEASE ORAL at 11:08

## 2025-01-26 RX ADMIN — FLUOXETINE HYDROCHLORIDE 40 MG: 20 CAPSULE ORAL at 11:08

## 2025-01-26 RX ADMIN — PERFLUTREN 13.04 MG: 6.52 INJECTION, SUSPENSION INTRAVENOUS at 16:01

## 2025-01-26 RX ADMIN — Medication 10 ML: at 11:08

## 2025-01-26 NOTE — PROGRESS NOTES
"Pharmacy Dosing Service  Anticoagulant  Enoxaparin    Assessment/Action/Plan:  Reviewed hemoglobin, platelets and hematocrit. Given patient's BMI (45.83 kg/m^2) and renal function (eCrCl 163.5 mL/min) will initiate enoxaparin 40mg SQ q 12 hours for VTE prophylaxis.      Subjective:  Arminda Mccloud is a 55 y.o. female on Enoxaparin 40 mg SQ every 12 hours for indication of VTE prophylaxis.  Objective:  [Ht: 162.6 cm (64\"); Wt: 121 kg (267 lb); BMI: Body mass index is 45.83 kg/m².]  Estimated Creatinine Clearance: 163.5 mL/min (A) (by C-G formula based on SCr of 0.51 mg/dL (L)).   Lab Results   Component Value Date    DDIMER <0.22 07/19/2018    DDIMER 0.49 04/16/2016      Lab Results   Component Value Date    INR 0.98 07/19/2018    INR 0.87 (L) 04/11/2018    INR 0.92 01/23/2018    PROTIME 13.3 07/19/2018    PROTIME 12.1 04/11/2018    PROTIME 12.6 01/23/2018      Lab Results   Component Value Date    HGB 14.3 01/26/2025    HGB 14.8 10/21/2020    HGB 14.5 07/19/2018      Lab Results   Component Value Date     01/26/2025     10/21/2020     07/19/2018       Melodie Fernandez, PharmAMILCAR  01/26/25 11:32 CST     "

## 2025-01-26 NOTE — DISCHARGE SUMMARY
DISCHARGE SUMMARY       Date of Admission: 1/26/2025  Date of Discharge:  1/26/2025  Primary Care Physician: Zelalem Lindo MD    Discharge Diagnoses:    SVT (supraventricular tachycardia)        Presenting Problem/History of Present Illness  SVT       Hospital Course  Patient was transferred to our facility after an episode of SVT.  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  This persisted for prolonged period with symptoms of palpitations and diaphoresis.  She went to the ER there where they attempted multiple IV medications and eventually DC cardioversion x 2.  She eventually did convert to sinus rhythm.  She was transferred here for cardiology evaluation.  She has been seen by electrophysiology today and plans week for outpatient SVT ablation.  She will be discharged home today on her same medications    Procedures Performed         Consults:   Consults       Date and Time Order Name Status Description    1/26/2025 10:26 AM Inpatient Cardiac Electrophysiology Consult      1/26/2025  4:29 AM Inpatient Cardiology Consult Completed             Pertinent Test Results:  Lab Results (most recent)       Procedure Component Value Units Date/Time    Comprehensive Metabolic Panel [[id number]]  (Abnormal) Collected: 01/26/25 1025    Specimen: Blood Updated: 01/26/25 1117     Glucose 97 mg/dL      BUN 7 mg/dL      Creatinine 0.51 mg/dL      Sodium 139 mmol/L      Potassium 3.8 mmol/L      Chloride 104 mmol/L      CO2 26.0 mmol/L      Calcium 8.7 mg/dL      Total Protein 7.4 g/dL      Albumin 3.8 g/dL      ALT (SGPT) 27 U/L      AST (SGOT) 19 U/L      Alkaline Phosphatase 71 U/L      Total Bilirubin 0.3 mg/dL      Globulin 3.6 gm/dL      A/G Ratio 1.1 g/dL      BUN/Creatinine Ratio 13.7     Anion Gap 9.0 mmol/L      eGFR 111.8 mL/min/1.73     Narrative:      GFR Categories in Chronic Kidney Disease (CKD)      GFR Category          GFR (mL/min/1.73)     Interpretation  G1                     90 or greater         Normal or high (1)  G2                      60-89                Mild decrease (1)  G3a                   45-59                Mild to moderate decrease  G3b                   30-44                Moderate to severe decrease  G4                    15-29                Severe decrease  G5                    14 or less           Kidney failure          (1)In the absence of evidence of kidney disease, neither GFR category G1 or G2 fulfill the criteria for CKD.    eGFR calculation 2021 CKD-EPI creatinine equation, which does not include race as a factor    TSH [[id number]]  (Normal) Collected: 01/26/25 1025    Specimen: Blood Updated: 01/26/25 1117     TSH 0.360 uIU/mL     Magnesium [[id number]]  (Normal) Collected: 01/26/25 1025    Specimen: Blood Updated: 01/26/25 1117     Magnesium 2.1 mg/dL     CBC (No Diff) [[id number]]  (Abnormal) Collected: 01/26/25 1025    Specimen: Blood Updated: 01/26/25 1111     WBC 10.96 10*3/mm3      RBC 5.01 10*6/mm3      Hemoglobin 14.3 g/dL      Hematocrit 43.6 %      MCV 87.0 fL      MCH 28.5 pg      MCHC 32.8 g/dL      RDW 13.0 %      RDW-SD 40.8 fl      MPV 9.5 fL      Platelets 311 10*3/mm3             Condition on Discharge: Stable    Discharge Disposition  Home or Self Care    Discharge Medications     Discharge Medications        Continue These Medications        Instructions Start Date   albuterol sulfate  (90 Base) MCG/ACT inhaler  Commonly known as: PROVENTIL HFA;VENTOLIN HFA;PROAIR HFA   2 puffs, Inhalation, Every 4 Hours PRN      FLUoxetine 40 MG capsule  Commonly known as: PROzac   40 mg, Daily      metoprolol succinate XL 50 MG 24 hr tablet  Commonly known as: TOPROL-XL   50 mg, Daily      Mounjaro 15 MG/0.5ML solution auto-injector  Generic drug: Tirzepatide   15 mg, Weekly               Discharge Diet:   Diet Instructions       Diet: Regular/House Diet; Thin (IDDSI 0)      Discharge Diet: Regular/House Diet     Fluid Consistency: Thin (IDDSI 0)            Discharge Care Plan / Instructions:    Activity at Discharge:   Activity Instructions       Activity as Tolerated              Follow-up Appointments  Future Appointments   Date Time Provider Department Center   7/14/2025  8:00 AM Nayla Peguero APRN [facility]     Additional Instructions for the Follow-ups that You Need to Schedule       Discharge Follow-up with PCP   As directed       Currently Documented PCP:    Zelalem Lindo MD    PCP Phone Number:    [phone]     Follow Up Details: 1 week                Test Results Pending at Discharge       Christian Ferrara MD  01/26/25  12:54 CST        Part of this note may be an electronic transcription/translation of spoken language to printed text using the Dragon Dictation System.

## 2025-01-26 NOTE — CONSULTS
Patient Care Team:  Zelalem Lindo MD as PCP - General  Zelalem Lindo MD as PCP - Family Medicine  Hal Velazquez MD as Consulting Physician (Otolaryngology)  Dagoberto Shrestha MD as Cardiologist (Cardiology)  Zelalem Lindo MD as Referring Physician (Family Medicine)  Justin Sanchez MD  REASON FOR REFERRAL: SVT  Chief complaint : weakness, dizziness     Subjective     Patient is a 55 y.o. female presents after a prolonged episode of SVT.  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  She states she felt herself go into SVT with palpitations ongoing for approximately an hour and then participated in coding of patient.  She was starting to feel weak and lightheaded so went to the ER.  EKGs and telemetry confirmed SVT.  She states her heart rates reached around 220 bpm.  She denies chest pain or shortness of breath but states she felt very weak, dizzy and lightheaded.  Notes indicate she was treated with IV Lopressor, IV diltiazem, adenosine, IV labetalol and was cardioverted twice at 50 J followed by 100 J.  Notes indicate cardioversion was not successful but she eventually converted to normal sinus rhythm after 15 mg of IV diltiazem.  She reports symptoms resolved after she converted to normal sinus rhythm but she continues to feel tired.    She reports she followed with Dr. Wong remotely and SVT was diagnosed in 2017.  She denies any other cardiac history.  She is a type II diabetic.    She takes Toprol-XL 50 mg daily at home.    She states she had a 4-hour episode when she was diagnosed back in 2017 but more recent episodes are typically a few seconds to a few minutes.  Her last episode was a few days ago.  She states sometimes episodes occur every few days and other times she may go 2 to 3 months without any episodes.    Review of Systems   Review of Systems   Constitutional:  Positive for fatigue. Negative for diaphoresis, fever and unexpected weight change.   HENT:  Negative for  nosebleeds.    Respiratory:  Negative for apnea, cough, chest tightness, shortness of breath and wheezing.    Cardiovascular:  Negative for chest pain, palpitations and leg swelling.   Gastrointestinal:  Negative for abdominal distention, nausea and vomiting.   Genitourinary:  Negative for hematuria.   Musculoskeletal:  Negative for gait problem.   Skin:  Negative for color change.   Neurological:  Positive for dizziness, weakness and light-headedness. Negative for syncope.       History  Past Medical History:   Diagnosis Date    Arthritis     Asthma     Bunion     Left foot    Callus     Left and right great toe    Chronic fatigue     Colon polyp 1990s    Depression     Diabetes mellitus     Essential hypertension 10/25/2023    Generalized anxiety disorder     Goiter 05/22/2018    Hypercalcemia     Hyperparathyroidism     Hypothyroidism     Ingrown toenail     Hx of    Irritable bowel syndrome     Kidney stone     Lactose intolerance 1980s    Melanoma of back     Multinodular goiter     Obesity     Onychomycosis     Panic     Pelvis fracture 1996    did not require surgery - healed on its own    Plantar fasciitis     Hx of,.corrected approx 10yrs ago    PONV (postoperative nausea and vomiting)     Sleep apnea     supposed to use machine but hasn't gotten it yet    Supraventricular tachycardia     Tendonitis     Vitamin D deficiency      Past Surgical History:   Procedure Laterality Date    CHOLECYSTECTOMY      COLONOSCOPY      Approx 5-10 yrs ago    COLONOSCOPY N/A 05/06/2024    Procedure: COLONOSCOPY WITH ANESTHESIA;  Surgeon: Melissa Smiley MD;  Location: [facility] ENDOSCOPY;  Service: Gastroenterology;  Laterality: N/A;  preop; screening   postop polyps   PCP Polo Lindo    ENDOSCOPY  09/28/2004    esophagus is normal-Hiatal Hernia    HAND SURGERY Right     JOINT REPLACEMENT Left     knee    TONSILLECTOMY AND ADENOIDECTOMY      UPPER GASTROINTESTINAL ENDOSCOPY      Approx 20 yrs ago    WIDE EXCISION LESION/MASS  TRUNK Left 10/26/2020    Procedure: WIDE EXCISION MELANOMA LEFT BACK AND LEFT AXILLARY SENTINEL LYMPH NODE BIOPSY WITH SPY;  Surgeon: Markell Gresham MD;  Location: [facility] OR;  Service: General;  Laterality: Left;     Family History   Problem Relation Age of Onset    No Known Problems Mother     Hypertension Father     Diabetes Father     Breast cancer Paternal Aunt     Diabetes Paternal Grandmother     Colon cancer Neg Hx     Colon polyps Neg Hx      Social History     Tobacco Use    Smoking status: Never    Smokeless tobacco: Never   Vaping Use    Vaping status: Never Used   Substance Use Topics    Alcohol use: Never    Drug use: Never     Medications Prior to Admission   Medication Sig Dispense Refill Last Dose/Taking    FLUoxetine (PROzac) 20 MG capsule Take 2 capsules by mouth Daily.   1/25/2025    metoprolol succinate XL (TOPROL-XL) 25 MG 24 hr tablet Take 2 tablets by mouth 2 (Two) Times a Day.   1/26/2025 Morning    Tirzepatide (Mounjaro) 15 MG/0.5ML solution pen-injector pen Inject 0.5 mL under the skin into the appropriate area as directed 1 (One) Time Per Week.   Past Week    clotrimazole-betamethasone (LOTRISONE) 1-0.05 % cream Apply 1 Application topically to the appropriate area as directed 2 (Two) Times a Day. Apply to affected area twice daily 45 g 3     fluticasone (FLONASE) 50 MCG/ACT nasal spray 2 sprays into the nostril(s) as directed by provider Daily.       Semaglutide,0.25 or 0.5MG/DOS, (Ozempic, 0.25 or 0.5 MG/DOSE,) 2 MG/1.5ML solution pen-injector Inject  under the skin into the appropriate area as directed 1 (One) Time Per Week.          Current Facility-Administered Medications:     metoprolol succinate XL (TOPROL-XL) 24 hr tablet 50 mg, 50 mg, Oral, Q24H, Waleska Zazueta APRN  Allergies:  Lamisil [terbinafine] and Buspirone    Objective     Vital Signs  Temp:  [97.8 °F (36.6 °C)] 97.8 °F (36.6 °C)  Heart Rate:  [75-80] 75  Resp:  [18] 18  BP: (120-132)/(64-71) 132/64    Physical Exam:    Vitals and nursing note reviewed.   Constitutional:       General: Not in acute distress.     Appearance: Well-developed and not in distress. Not diaphoretic.   Neck:      Vascular: No JVD.   Pulmonary:      Effort: Pulmonary effort is normal. No respiratory distress.      Breath sounds: Normal breath sounds.   Cardiovascular:      Normal rate. Regular rhythm.      Murmurs: There is no murmur.   Edema:     Peripheral edema absent.   Abdominal:      Tenderness: There is no abdominal tenderness.   Skin:     General: Skin is warm and dry.   Neurological:      Mental Status: Alert and oriented to person, place, and time.       Results Review:     Lab Results (last 72 hours)       ** No results found for the last 72 hours. **            Assessment & Plan     Sustained SVT: This lasted well over an hour at the outlying facility and she eventually converted to normal sinus rhythm after she was treated with IV Lopressor, IV diltiazem, IV labetalol, 2 doses of adenosine, and cardioversion was attempted twice.  Notes indicate she converted after she was given 15 mg of IV diltiazem after cardioversions were unsuccessful. Originally diagnosed in 2017.    -Resume home beta-blocker-Toprol-XL 50 mg daily  -2D echocardiogram ordered by admitting physician  -Check TSH and electrolytes  -Consider EP consultation-inpatient versus outpatient, either is reasonable.  -Monitor telemetry  -Does not need to be n.p.o. from a cardiology standpoint.    2.  Diabetes mellitus type 2: Management per admitting physician    I discussed the patient's findings and my recommendations with patient and nursing staff.     Electronically signed by BECKY Lopez, 01/26/25, 10:12 AM CST.

## 2025-01-26 NOTE — CONSULTS
"EP CONSULT NOTE    Subjective        History of Present Illness    EP Problems:  1.  Sustained SVT    Cardiology Problems:  1.  Hypertension    Medical Problems:  1.  Obesity  2.  Asthma  3.  Osteoarthritis  4.  Type 2 diabetes  5.  Hypothyroidism  6.  Generalized anxiety  7.  Multinodular goiter  8.  Obstructive sleep apnea    Arminda Mccloud is a 55 y.o. female with problem list as above for whom EP is consulted regarding sustained SVT.  She has had numerous episodes of sustained SVT over the years.  She has required adenosine for treatment at times (which has been effective).  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  She ultimately found that she was in SVT and went to the ER for evaluation.  In the ER, she was given adenosine x 2 (18 mg total) which were not effective.  She subsequently underwent direct-current cardioversion restoration of normal sinus rhythm.  Given these findings, she was transferred to our hospital for cardiac evaluation.    Objective   Vital Signs:  /64 (BP Location: Left arm, Patient Position: Lying)   Pulse 75   Temp 97.8 °F (36.6 °C) (Oral)   Resp 18   Ht 162.6 cm (64\")   Wt 121 kg (267 lb)   SpO2 95%   BMI 45.83 kg/m²   Estimated body mass index is 45.83 kg/m² as calculated from the following:    Height as of this encounter: 162.6 cm (64\").    Weight as of this encounter: 121 kg (267 lb).      Physical Exam  Vitals reviewed.   Constitutional:       Appearance: She is obese.   Cardiovascular:      Rate and Rhythm: Normal rate and regular rhythm.      Pulses: Normal pulses.      Heart sounds: Normal heart sounds. No murmur heard.  Pulmonary:      Effort: Pulmonary effort is normal. No respiratory distress.      Breath sounds: Normal breath sounds.   Skin:     General: Skin is warm and dry.   Neurological:      General: No focal deficit present.      Mental Status: She is alert and oriented " to person, place, and time.   Psychiatric:         Mood and Affect: Mood normal.         Judgment: Judgment normal.          Result Review :  The following data was reviewed by: Jackelyn Renner MD on 01/26/2025:  CMP          1/26/2025    10:25   CMP   Glucose 97    BUN 7    Creatinine 0.51    EGFR 111.8    Sodium 139    Potassium 3.8    Chloride 104    Calcium 8.7    Total Protein 7.4    Albumin 3.8    Globulin 3.6    Total Bilirubin 0.3    Alkaline Phosphatase 71    AST (SGOT) 19    ALT (SGPT) 27    Albumin/Globulin Ratio 1.1    BUN/Creatinine Ratio 13.7    Anion Gap 9.0      CBC          1/26/2025    10:25   CBC   WBC 10.96    RBC 5.01    Hemoglobin 14.3    Hematocrit 43.6    MCV 87.0    MCH 28.5    MCHC 32.8    RDW 13.0    Platelets 311      TSH          1/26/2025    10:25   TSH   TSH 0.360        ECG from the outside hospital directly visualized independently reviewed: Right bundle branch block, tachycardia at a rate of 189 bpm.  Findings consistent with SVT.             Assessment and Plan   Problems:  Sustained SVT  Intermittent right bundle branch block    Arminda Mccloud is a 55 y.o. female with problem list as above for whom EP is consulted regarding sustained SVT, intermittent right bundle branch block.    Her findings are overall consistent with sustained SVT.  She is symptomatic with these episodes.  We discussed available treatment options including further medical management versus ablation.  At this time given the recurrent episodes causing recurrent ER visits and significant impact on quality of life, I do think that ablation is going to be her best treatment option.      I have discussed risks, benefits, and alternatives of an electrophysiology study and ablation for supraventricular tachycardia with the patient.  Alternatives discussed include continued observation and medical management.  An electrophysiology study with ablation is an invasive procedure with possible complications that include but  are not limited to vascular access complications, internal bleeding, tamponade requiring pericardiocentesis or cardiac surgery, stroke, MI, embolism, myocardial injury, injury to the normal conduction system requiring a pacemaker, and ultimately death.  We also discussed that given the uncertain nature of the diagnosis, therapeutic efficacy can be variable.  Possibilities of recurrent arrhythmias and the possible need for additional procedures and/or medical therapy was discussed. Questions asked were appropriately answered.  No guarantees were made or implied.   Despite this, they would still like to proceed.    Plan:  -Scheduled for outpatient SVT ablation  -Continue metoprolol succinate  -Advised to reattempt vagal maneuvers if she has breakthrough episodes  -May need higher dose of adenosine if she has recurrences  -Okay for discharge home from EP standpoint; will arrange ablation and outpatient follow             Part of this note may be an electronic transcription/translation of spoken language to printed text using the Dragon Dictation System.

## 2025-01-26 NOTE — H&P
Date of Admission: 1/26/2025  Primary Care Physician: Zelalem Lindo MD    Subjective     Chief Complaint: SVT    History of Present Illness  Ms. Mccloud is a 55-year-old female who was transferred from an outlying hospital.  She has a history of SVT, and felt herself go into SVT with palpitations.  She started feel weak and lightheaded and went to the ER.  She was treated with IV Lopressor, diltiazem, adenosine, labetalol and was cardioverted twice.  Cardioversion was not successful but she eventually converted to sinus rhythm after 15 mg of IV diltiazem.  She is in normal sinus rhythm currently.  Previous cardiologist was Dr. Wong, and had been taking Toprol XL 50 mg daily at home.  She seems to have episodes of palpitations and SVT fairly frequently.  Tells me she is feeling better and wants to go home.  Nuys any chest discomfort, palpitations, or shortness of breath currently.  She has not had any worsening lower extremity edema.        Review of Systems   Constitutional:  Negative for fever.   Respiratory:  Negative for shortness of breath.    Cardiovascular:  Negative for chest pain.        Otherwise complete ROS reviewed and negative except as mentioned in the HPI.      Past Medical History:   Past Medical History:   Diagnosis Date    Arthritis     Asthma     Bunion     Left foot    Callus     Left and right great toe    Chronic fatigue     Colon polyp 1990s    Depression     Diabetes mellitus     Essential hypertension 10/25/2023    Generalized anxiety disorder     Goiter 05/22/2018    Hypercalcemia     Hyperparathyroidism     Hypothyroidism     Ingrown toenail     Hx of    Irritable bowel syndrome     Kidney stone     Lactose intolerance 1980s    Melanoma of back     Multinodular goiter     Obesity     Onychomycosis     Panic     Pelvis fracture 1996    did not require surgery - healed on its own    Plantar fasciitis     Hx of,.corrected approx 10yrs ago    PONV (postoperative nausea and vomiting)      Sleep apnea     supposed to use machine but hasn't gotten it yet    Supraventricular tachycardia     Tendonitis     Vitamin D deficiency        Past Surgical History:  Past Surgical History:   Procedure Laterality Date    CHOLECYSTECTOMY      COLONOSCOPY      Approx 5-10 yrs ago    COLONOSCOPY N/A 05/06/2024    Procedure: COLONOSCOPY WITH ANESTHESIA;  Surgeon: Melissa Smiley MD;  Location: [facility] ENDOSCOPY;  Service: Gastroenterology;  Laterality: N/A;  preop; screening   postop polyps   PCP [facility]    ENDOSCOPY  09/28/2004    esophagus is normal-Hiatal Hernia    HAND SURGERY Right     JOINT REPLACEMENT Left     knee    TONSILLECTOMY AND ADENOIDECTOMY      UPPER GASTROINTESTINAL ENDOSCOPY      Approx 20 yrs ago    WIDE EXCISION LESION/MASS TRUNK Left 10/26/2020    Procedure: WIDE EXCISION MELANOMA LEFT BACK AND LEFT AXILLARY SENTINEL LYMPH NODE BIOPSY WITH SPY;  Surgeon: Markell Gresham MD;  Location: [facility] OR;  Service: General;  Laterality: Left;       Social History:  reports that she has never smoked. She has never used smokeless tobacco. She reports that she does not drink alcohol and does not use drugs.    Family History: family history includes Breast cancer in her paternal aunt; Diabetes in her father and paternal grandmother; Hypertension in her father; No Known Problems in her mother.       Allergies:  Allergies   Allergen Reactions    Lamisil [Terbinafine] Other (See Comments)     Other reaction(s): Other (See Comments)  Hair loss and mood change  Hair loss and mood change    Buspirone Rash       Medications:  Prior to Admission medications    Medication Sig Start Date End Date Taking? Authorizing Provider   FLUoxetine (PROzac) 40 MG capsule Take 1 capsule by mouth Daily.   Yes Joaquín Gil MD   metoprolol succinate XL (TOPROL-XL) 50 MG 24 hr tablet Take 1 tablet by mouth Daily.   Yes ProviderJoaquín MD   Tirzepatide (Mounjaro) 15 MG/0.5ML solution pen-injector pen Inject 0.5 mL  "under the skin into the appropriate area as directed 1 (One) Time Per Week. 3/6/23  Yes Joaquín Gil MD   FLUoxetine (PROzac) 20 MG capsule Take 2 capsules by mouth Daily.  1/26/25 Yes Joaquín Gil MD   metoprolol succinate XL (TOPROL-XL) 25 MG 24 hr tablet Take 2 tablets by mouth 2 (Two) Times a Day.  1/26/25 Yes Joaquín Gil MD   albuterol sulfate  (90 Base) MCG/ACT inhaler Inhale 2 puffs Every 4 (Four) Hours As Needed for Wheezing.    Joaquín Gil MD   clotrimazole-betamethasone (LOTRISONE) 1-0.05 % cream Apply 1 Application topically to the appropriate area as directed 2 (Two) Times a Day. Apply to affected area twice daily  Patient not taking: Reported on 1/26/2025 7/16/24   Shimon Gutierres DPM   fluticasone (FLONASE) 50 MCG/ACT nasal spray 2 sprays into the nostril(s) as directed by provider Daily.  Patient not taking: Reported on 1/26/2025    Joaquín Gil MD   Semaglutide,0.25 or 0.5MG/DOS, (Ozempic, 0.25 or 0.5 MG/DOSE,) 2 MG/1.5ML solution pen-injector Inject  under the skin into the appropriate area as directed 1 (One) Time Per Week.  Patient not taking: Reported on 1/26/2025    Joaquín Gil MD       Objective     Vital Signs: /64 (BP Location: Left arm, Patient Position: Lying)   Pulse 75   Temp 97.8 °F (36.6 °C) (Oral)   Resp 18   Ht 162.6 cm (64\")   Wt 121 kg (267 lb)   SpO2 95%   BMI 45.83 kg/m²   Physical Exam  Constitutional:       Appearance: She is obese.   HENT:      Head: Normocephalic and atraumatic.      Mouth/Throat:      Mouth: Mucous membranes are moist.      Pharynx: Oropharynx is clear.   Eyes:      Extraocular Movements: Extraocular movements intact.      Pupils: Pupils are equal, round, and reactive to light.   Cardiovascular:      Rate and Rhythm: Normal rate and regular rhythm.      Pulses: Normal pulses.      Heart sounds: Normal heart sounds.   Pulmonary:      Effort: Pulmonary effort is normal.      Breath " sounds: Normal breath sounds.   Abdominal:      General: Abdomen is flat.      Palpations: Abdomen is soft.   Musculoskeletal:         General: Normal range of motion.   Skin:     General: Skin is warm and dry.      Capillary Refill: Capillary refill takes less than 2 seconds.   Neurological:      General: No focal deficit present.      Mental Status: She is alert and oriented to person, place, and time.   Psychiatric:         Mood and Affect: Mood normal.         Behavior: Behavior normal.             Results Reviewed:  Lab Results (last 24 hours)       Procedure Component Value Units Date/Time    Comprehensive Metabolic Panel [[id number]] Collected: 01/26/25 1025    Specimen: Blood Updated: 01/26/25 1047    TSH [[id number]] Collected: 01/26/25 1025    Specimen: Blood Updated: 01/26/25 1047    Magnesium [[id number]] Collected: 01/26/25 1025    Specimen: Blood Updated: 01/26/25 1047          Imaging Results (Last 24 Hours)       ** No results found for the last 24 hours. **            I have personally reviewed and interpreted the radiology studies and ECG obtained at time of admission.     Assessment / Plan     Assessment & Plan  Supraventricular tachycardia    1.  Appreciate cardiology's input.  Will check 2D echocardiogram, and continue with home dose of beta-blocker for now.  Will check TSH and electrolytes.  May need electrophysiology consultation outpatient.  2.  If echo is normal, may be discharged later.    Further orders per Dr. Ferrara.      Aneudy Kruger, APRN   01/26/25   10:54 CST

## 2025-01-27 PROBLEM — I47.10 SUSTAINED SVT: Status: ACTIVE | Noted: 2025-01-26

## 2025-01-27 LAB
QT INTERVAL: 422 MS
QTC INTERVAL: 462 MS

## 2025-01-30 ENCOUNTER — HOSPITAL ENCOUNTER (INPATIENT)
Facility: HOSPITAL | Age: 55
LOS: 1 days | Discharge: HOME | End: 2025-01-31
Attending: FAMILY MEDICINE | Admitting: FAMILY MEDICINE
Payer: COMMERCIAL

## 2025-01-30 ENCOUNTER — ANESTHESIA EVENT (OUTPATIENT)
Dept: CARDIOLOGY | Facility: HOSPITAL | Age: 55
End: 2025-01-30
Payer: COMMERCIAL

## 2025-01-30 ENCOUNTER — APPOINTMENT (OUTPATIENT)
Dept: GENERAL RADIOLOGY | Facility: HOSPITAL | Age: 55
End: 2025-01-30
Payer: COMMERCIAL

## 2025-01-30 ENCOUNTER — ANESTHESIA (OUTPATIENT)
Dept: CARDIOLOGY | Facility: HOSPITAL | Age: 55
End: 2025-01-30
Payer: COMMERCIAL

## 2025-01-30 DIAGNOSIS — R00.0 SINUS TACHYCARDIA: ICD-10-CM

## 2025-01-30 DIAGNOSIS — I47.10 SVT (SUPRAVENTRICULAR TACHYCARDIA): Primary | ICD-10-CM

## 2025-01-30 DIAGNOSIS — I47.10 SUSTAINED SVT: ICD-10-CM

## 2025-01-30 LAB
ALBUMIN SERPL-MCNC: 4.3 G/DL (ref 3.5–5.2)
ALBUMIN/GLOB SERPL: 1 G/DL
ALP SERPL-CCNC: 82 U/L (ref 39–117)
ALT SERPL W P-5'-P-CCNC: 33 U/L (ref 1–33)
ANION GAP SERPL CALCULATED.3IONS-SCNC: 12 MMOL/L (ref 5–15)
ANION GAP SERPL CALCULATED.3IONS-SCNC: 18 MMOL/L (ref 5–15)
APTT PPP: 26.8 SECONDS (ref 24.5–36)
AST SERPL-CCNC: 25 U/L (ref 1–32)
BASOPHILS # BLD AUTO: 0.04 10*3/MM3 (ref 0–0.2)
BASOPHILS # BLD AUTO: 0.06 10*3/MM3 (ref 0–0.2)
BASOPHILS NFR BLD AUTO: 0.4 % (ref 0–1.5)
BASOPHILS NFR BLD AUTO: 0.5 % (ref 0–1.5)
BILIRUB SERPL-MCNC: 0.5 MG/DL (ref 0–1.2)
BUN SERPL-MCNC: 10 MG/DL (ref 6–20)
BUN SERPL-MCNC: 13 MG/DL (ref 6–20)
BUN/CREAT SERPL: 18.1 (ref 7–25)
BUN/CREAT SERPL: 19.6 (ref 7–25)
CALCIUM SPEC-SCNC: 9.1 MG/DL (ref 8.6–10.5)
CALCIUM SPEC-SCNC: 9.4 MG/DL (ref 8.6–10.5)
CHLORIDE SERPL-SCNC: 101 MMOL/L (ref 98–107)
CHLORIDE SERPL-SCNC: 103 MMOL/L (ref 98–107)
CO2 SERPL-SCNC: 18 MMOL/L (ref 22–29)
CO2 SERPL-SCNC: 22 MMOL/L (ref 22–29)
CREAT SERPL-MCNC: 0.51 MG/DL (ref 0.57–1)
CREAT SERPL-MCNC: 0.72 MG/DL (ref 0.57–1)
DEPRECATED RDW RBC AUTO: 38.7 FL (ref 37–54)
DEPRECATED RDW RBC AUTO: 39.7 FL (ref 37–54)
EGFRCR SERPLBLD CKD-EPI 2021: 100.1 ML/MIN/1.73
EGFRCR SERPLBLD CKD-EPI 2021: 111.8 ML/MIN/1.73
EOSINOPHIL # BLD AUTO: 0.07 10*3/MM3 (ref 0–0.4)
EOSINOPHIL # BLD AUTO: 0.09 10*3/MM3 (ref 0–0.4)
EOSINOPHIL NFR BLD AUTO: 0.6 % (ref 0.3–6.2)
EOSINOPHIL NFR BLD AUTO: 0.9 % (ref 0.3–6.2)
ERYTHROCYTE [DISTWIDTH] IN BLOOD BY AUTOMATED COUNT: 12.6 % (ref 12.3–15.4)
ERYTHROCYTE [DISTWIDTH] IN BLOOD BY AUTOMATED COUNT: 13 % (ref 12.3–15.4)
GEN 5 1HR TROPONIN T REFLEX: 71 NG/L
GLOBULIN UR ELPH-MCNC: 4.3 GM/DL
GLUCOSE SERPL-MCNC: 114 MG/DL (ref 65–99)
GLUCOSE SERPL-MCNC: 155 MG/DL (ref 65–99)
HCT VFR BLD AUTO: 48.3 % (ref 34–46.6)
HCT VFR BLD AUTO: 50.7 % (ref 34–46.6)
HGB BLD-MCNC: 16.1 G/DL (ref 12–15.9)
HGB BLD-MCNC: 16.8 G/DL (ref 12–15.9)
HOLD SPECIMEN: NORMAL
HOLD SPECIMEN: NORMAL
IMM GRANULOCYTES # BLD AUTO: 0.05 10*3/MM3 (ref 0–0.05)
IMM GRANULOCYTES # BLD AUTO: 0.05 10*3/MM3 (ref 0–0.05)
IMM GRANULOCYTES NFR BLD AUTO: 0.4 % (ref 0–0.5)
IMM GRANULOCYTES NFR BLD AUTO: 0.5 % (ref 0–0.5)
INR PPP: 0.99 (ref 0.91–1.09)
INR PPP: 1.01 (ref 0.91–1.09)
LYMPHOCYTES # BLD AUTO: 3.16 10*3/MM3 (ref 0.7–3.1)
LYMPHOCYTES # BLD AUTO: 3.57 10*3/MM3 (ref 0.7–3.1)
LYMPHOCYTES NFR BLD AUTO: 29.9 % (ref 19.6–45.3)
LYMPHOCYTES NFR BLD AUTO: 30.3 % (ref 19.6–45.3)
MAGNESIUM SERPL-MCNC: 1.9 MG/DL (ref 1.6–2.6)
MAGNESIUM SERPL-MCNC: 2.1 MG/DL (ref 1.6–2.6)
MCH RBC QN AUTO: 27.7 PG (ref 26.6–33)
MCH RBC QN AUTO: 28.1 PG (ref 26.6–33)
MCHC RBC AUTO-ENTMCNC: 33.1 G/DL (ref 31.5–35.7)
MCHC RBC AUTO-ENTMCNC: 33.3 G/DL (ref 31.5–35.7)
MCV RBC AUTO: 83.5 FL (ref 79–97)
MCV RBC AUTO: 84.3 FL (ref 79–97)
MONOCYTES # BLD AUTO: 0.8 10*3/MM3 (ref 0.1–0.9)
MONOCYTES # BLD AUTO: 0.99 10*3/MM3 (ref 0.1–0.9)
MONOCYTES NFR BLD AUTO: 7.7 % (ref 5–12)
MONOCYTES NFR BLD AUTO: 8.3 % (ref 5–12)
NEUTROPHILS NFR BLD AUTO: 6.3 10*3/MM3 (ref 1.7–7)
NEUTROPHILS NFR BLD AUTO: 60.2 % (ref 42.7–76)
NEUTROPHILS NFR BLD AUTO: 60.3 % (ref 42.7–76)
NEUTROPHILS NFR BLD AUTO: 7.18 10*3/MM3 (ref 1.7–7)
NRBC BLD AUTO-RTO: 0 /100 WBC (ref 0–0.2)
NRBC BLD AUTO-RTO: 0 /100 WBC (ref 0–0.2)
NT-PROBNP SERPL-MCNC: 90.3 PG/ML (ref 0–900)
PHOSPHATE SERPL-MCNC: 3.9 MG/DL (ref 2.5–4.5)
PLATELET # BLD AUTO: 408 10*3/MM3 (ref 140–450)
PLATELET # BLD AUTO: 439 10*3/MM3 (ref 140–450)
PMV BLD AUTO: 9.3 FL (ref 6–12)
PMV BLD AUTO: 9.5 FL (ref 6–12)
POTASSIUM SERPL-SCNC: 3.5 MMOL/L (ref 3.5–5.2)
POTASSIUM SERPL-SCNC: 3.9 MMOL/L (ref 3.5–5.2)
PROT SERPL-MCNC: 8.6 G/DL (ref 6–8.5)
PROTHROMBIN TIME: 13.6 SECONDS (ref 11.8–14.8)
PROTHROMBIN TIME: 13.8 SECONDS (ref 11.8–14.8)
QT INTERVAL: 290 MS
QT INTERVAL: 390 MS
QT INTERVAL: 450 MS
QTC INTERVAL: 455 MS
QTC INTERVAL: 460 MS
QTC INTERVAL: 476 MS
RBC # BLD AUTO: 5.73 10*6/MM3 (ref 3.77–5.28)
RBC # BLD AUTO: 6.07 10*6/MM3 (ref 3.77–5.28)
SODIUM SERPL-SCNC: 137 MMOL/L (ref 136–145)
SODIUM SERPL-SCNC: 137 MMOL/L (ref 136–145)
TROPONIN T NUMERIC DELTA: 60 NG/L
TROPONIN T SERPL HS-MCNC: 11 NG/L
TSH SERPL DL<=0.05 MIU/L-ACNC: 1.4 UIU/ML (ref 0.27–4.2)
WBC NRBC COR # BLD AUTO: 10.44 10*3/MM3 (ref 3.4–10.8)
WBC NRBC COR # BLD AUTO: 11.92 10*3/MM3 (ref 3.4–10.8)
WHOLE BLOOD HOLD COAG: NORMAL
WHOLE BLOOD HOLD SPECIMEN: NORMAL

## 2025-01-30 PROCEDURE — 25010000002 AMIODARONE IN DEXTROSE 5% 360-4.14 MG/200ML-% SOLUTION: Performed by: FAMILY MEDICINE

## 2025-01-30 PROCEDURE — C1894 INTRO/SHEATH, NON-LASER: HCPCS | Performed by: STUDENT IN AN ORGANIZED HEALTH CARE EDUCATION/TRAINING PROGRAM

## 2025-01-30 PROCEDURE — 25810000003 SODIUM CHLORIDE 0.9 % SOLUTION: Performed by: FAMILY MEDICINE

## 2025-01-30 PROCEDURE — G0378 HOSPITAL OBSERVATION PER HR: HCPCS

## 2025-01-30 PROCEDURE — C1730 CATH, EP, 19 OR FEW ELECT: HCPCS | Performed by: STUDENT IN AN ORGANIZED HEALTH CARE EDUCATION/TRAINING PROGRAM

## 2025-01-30 PROCEDURE — 93010 ELECTROCARDIOGRAM REPORT: CPT | Performed by: INTERNAL MEDICINE

## 2025-01-30 PROCEDURE — 85730 THROMBOPLASTIN TIME PARTIAL: CPT | Performed by: FAMILY MEDICINE

## 2025-01-30 PROCEDURE — 25010000002 LIDOCAINE 2% SOLUTION: Performed by: STUDENT IN AN ORGANIZED HEALTH CARE EDUCATION/TRAINING PROGRAM

## 2025-01-30 PROCEDURE — 93662 INTRACARDIAC ECG (ICE): CPT | Performed by: STUDENT IN AN ORGANIZED HEALTH CARE EDUCATION/TRAINING PROGRAM

## 2025-01-30 PROCEDURE — 36415 COLL VENOUS BLD VENIPUNCTURE: CPT | Performed by: FAMILY MEDICINE

## 2025-01-30 PROCEDURE — 4A0234Z MEASUREMENT OF CARDIAC ELECTRICAL ACTIVITY, PERCUTANEOUS APPROACH: ICD-10-PCS | Performed by: STUDENT IN AN ORGANIZED HEALTH CARE EDUCATION/TRAINING PROGRAM

## 2025-01-30 PROCEDURE — 83735 ASSAY OF MAGNESIUM: CPT | Performed by: FAMILY MEDICINE

## 2025-01-30 PROCEDURE — 80048 BASIC METABOLIC PNL TOTAL CA: CPT | Performed by: FAMILY MEDICINE

## 2025-01-30 PROCEDURE — 85025 COMPLETE CBC W/AUTO DIFF WBC: CPT | Performed by: FAMILY MEDICINE

## 2025-01-30 PROCEDURE — 25010000002 AMIODARONE IN DEXTROSE 5% 150-4.21 MG/100ML-% SOLUTION: Performed by: FAMILY MEDICINE

## 2025-01-30 PROCEDURE — C1766 INTRO/SHEATH,STRBLE,NON-PEEL: HCPCS | Performed by: STUDENT IN AN ORGANIZED HEALTH CARE EDUCATION/TRAINING PROGRAM

## 2025-01-30 PROCEDURE — 93005 ELECTROCARDIOGRAM TRACING: CPT | Performed by: FAMILY MEDICINE

## 2025-01-30 PROCEDURE — 25010000002 ADENOSINE PER 6 MG: Performed by: FAMILY MEDICINE

## 2025-01-30 PROCEDURE — B24BZZ4 ULTRASONOGRAPHY OF HEART WITH AORTA, TRANSESOPHAGEAL: ICD-10-PCS | Performed by: STUDENT IN AN ORGANIZED HEALTH CARE EDUCATION/TRAINING PROGRAM

## 2025-01-30 PROCEDURE — 99285 EMERGENCY DEPT VISIT HI MDM: CPT

## 2025-01-30 PROCEDURE — 93005 ELECTROCARDIOGRAM TRACING: CPT | Performed by: STUDENT IN AN ORGANIZED HEALTH CARE EDUCATION/TRAINING PROGRAM

## 2025-01-30 PROCEDURE — 25010000002 PROPOFOL 1000 MG/100ML EMULSION: Performed by: NURSE ANESTHETIST, CERTIFIED REGISTERED

## 2025-01-30 PROCEDURE — 99214 OFFICE O/P EST MOD 30 MIN: CPT | Performed by: STUDENT IN AN ORGANIZED HEALTH CARE EDUCATION/TRAINING PROGRAM

## 2025-01-30 PROCEDURE — 84484 ASSAY OF TROPONIN QUANT: CPT | Performed by: FAMILY MEDICINE

## 2025-01-30 PROCEDURE — 02583ZZ DESTRUCTION OF CONDUCTION MECHANISM, PERCUTANEOUS APPROACH: ICD-10-PCS | Performed by: STUDENT IN AN ORGANIZED HEALTH CARE EDUCATION/TRAINING PROGRAM

## 2025-01-30 PROCEDURE — 80050 GENERAL HEALTH PANEL: CPT | Performed by: FAMILY MEDICINE

## 2025-01-30 PROCEDURE — 85610 PROTHROMBIN TIME: CPT | Performed by: FAMILY MEDICINE

## 2025-01-30 PROCEDURE — 85610 PROTHROMBIN TIME: CPT | Performed by: STUDENT IN AN ORGANIZED HEALTH CARE EDUCATION/TRAINING PROGRAM

## 2025-01-30 PROCEDURE — 83880 ASSAY OF NATRIURETIC PEPTIDE: CPT | Performed by: FAMILY MEDICINE

## 2025-01-30 PROCEDURE — 93653 COMPRE EP EVAL TX SVT: CPT | Performed by: STUDENT IN AN ORGANIZED HEALTH CARE EDUCATION/TRAINING PROGRAM

## 2025-01-30 PROCEDURE — 25010000002 FENTANYL CITRATE (PF) 100 MCG/2ML SOLUTION: Performed by: NURSE ANESTHETIST, CERTIFIED REGISTERED

## 2025-01-30 PROCEDURE — 71045 X-RAY EXAM CHEST 1 VIEW: CPT

## 2025-01-30 PROCEDURE — C1759 CATH, INTRA ECHOCARDIOGRAPHY: HCPCS | Performed by: STUDENT IN AN ORGANIZED HEALTH CARE EDUCATION/TRAINING PROGRAM

## 2025-01-30 PROCEDURE — 93005 ELECTROCARDIOGRAM TRACING: CPT

## 2025-01-30 PROCEDURE — 84100 ASSAY OF PHOSPHORUS: CPT | Performed by: FAMILY MEDICINE

## 2025-01-30 PROCEDURE — C1732 CATH, EP, DIAG/ABL, 3D/VECT: HCPCS | Performed by: STUDENT IN AN ORGANIZED HEALTH CARE EDUCATION/TRAINING PROGRAM

## 2025-01-30 PROCEDURE — 25010000002 ENOXAPARIN PER 10 MG: Performed by: STUDENT IN AN ORGANIZED HEALTH CARE EDUCATION/TRAINING PROGRAM

## 2025-01-30 RX ORDER — SODIUM CHLORIDE 0.9 % (FLUSH) 0.9 %
10 SYRINGE (ML) INJECTION AS NEEDED
Status: DISCONTINUED | OUTPATIENT
Start: 2025-01-30 | End: 2025-01-30 | Stop reason: HOSPADM

## 2025-01-30 RX ORDER — SODIUM CHLORIDE 0.9 % (FLUSH) 0.9 %
10 SYRINGE (ML) INJECTION AS NEEDED
Status: DISCONTINUED | OUTPATIENT
Start: 2025-01-30 | End: 2025-01-30

## 2025-01-30 RX ORDER — POTASSIUM CHLORIDE 1500 MG/1
40 TABLET, EXTENDED RELEASE ORAL ONCE
Status: DISCONTINUED | OUTPATIENT
Start: 2025-01-30 | End: 2025-01-31 | Stop reason: HOSPADM

## 2025-01-30 RX ORDER — SODIUM CHLORIDE 0.9 % (FLUSH) 0.9 %
10 SYRINGE (ML) INJECTION EVERY 12 HOURS SCHEDULED
Status: DISCONTINUED | OUTPATIENT
Start: 2025-01-30 | End: 2025-01-31 | Stop reason: HOSPADM

## 2025-01-30 RX ORDER — LIDOCAINE HYDROCHLORIDE 20 MG/ML
INJECTION, SOLUTION INFILTRATION; PERINEURAL
Status: DISCONTINUED | OUTPATIENT
Start: 2025-01-30 | End: 2025-01-30 | Stop reason: HOSPADM

## 2025-01-30 RX ORDER — ACETAMINOPHEN 325 MG/1
650 TABLET ORAL EVERY 4 HOURS PRN
Status: DISCONTINUED | OUTPATIENT
Start: 2025-01-30 | End: 2025-01-31 | Stop reason: HOSPADM

## 2025-01-30 RX ORDER — PROPOFOL 10 MG/ML
INJECTION, EMULSION INTRAVENOUS CONTINUOUS PRN
Status: DISCONTINUED | OUTPATIENT
Start: 2025-01-30 | End: 2025-01-30 | Stop reason: SURG

## 2025-01-30 RX ORDER — BISACODYL 5 MG/1
5 TABLET, DELAYED RELEASE ORAL DAILY PRN
Status: DISCONTINUED | OUTPATIENT
Start: 2025-01-30 | End: 2025-01-31 | Stop reason: HOSPADM

## 2025-01-30 RX ORDER — ACETAMINOPHEN 160 MG/5ML
650 SOLUTION ORAL EVERY 4 HOURS PRN
Status: DISCONTINUED | OUTPATIENT
Start: 2025-01-30 | End: 2025-01-31 | Stop reason: HOSPADM

## 2025-01-30 RX ORDER — ENOXAPARIN SODIUM 100 MG/ML
40 INJECTION SUBCUTANEOUS EVERY 12 HOURS
Status: DISCONTINUED | OUTPATIENT
Start: 2025-01-30 | End: 2025-01-31 | Stop reason: HOSPADM

## 2025-01-30 RX ORDER — BUPIVACAINE HCL/0.9 % NACL/PF 0.125 %
PLASTIC BAG, INJECTION (ML) EPIDURAL AS NEEDED
Status: DISCONTINUED | OUTPATIENT
Start: 2025-01-30 | End: 2025-01-30 | Stop reason: SURG

## 2025-01-30 RX ORDER — CETIRIZINE HYDROCHLORIDE 10 MG/1
10 TABLET ORAL DAILY
COMMUNITY

## 2025-01-30 RX ORDER — SODIUM CHLORIDE 0.9 % (FLUSH) 0.9 %
10 SYRINGE (ML) INJECTION EVERY 12 HOURS SCHEDULED
Status: DISCONTINUED | OUTPATIENT
Start: 2025-01-30 | End: 2025-01-30 | Stop reason: HOSPADM

## 2025-01-30 RX ORDER — ALBUTEROL SULFATE 0.83 MG/ML
2.5 SOLUTION RESPIRATORY (INHALATION) EVERY 4 HOURS PRN
COMMUNITY

## 2025-01-30 RX ORDER — FENTANYL CITRATE 50 UG/ML
INJECTION, SOLUTION INTRAMUSCULAR; INTRAVENOUS AS NEEDED
Status: DISCONTINUED | OUTPATIENT
Start: 2025-01-30 | End: 2025-01-30 | Stop reason: SURG

## 2025-01-30 RX ORDER — NITROGLYCERIN 0.4 MG/1
0.4 TABLET SUBLINGUAL
Status: DISCONTINUED | OUTPATIENT
Start: 2025-01-30 | End: 2025-01-31 | Stop reason: HOSPADM

## 2025-01-30 RX ORDER — POLYETHYLENE GLYCOL 3350 17 G/17G
17 POWDER, FOR SOLUTION ORAL DAILY PRN
Status: DISCONTINUED | OUTPATIENT
Start: 2025-01-30 | End: 2025-01-31 | Stop reason: HOSPADM

## 2025-01-30 RX ORDER — SODIUM CHLORIDE 9 MG/ML
40 INJECTION, SOLUTION INTRAVENOUS AS NEEDED
Status: DISCONTINUED | OUTPATIENT
Start: 2025-01-30 | End: 2025-01-31 | Stop reason: HOSPADM

## 2025-01-30 RX ORDER — BISACODYL 10 MG
10 SUPPOSITORY, RECTAL RECTAL DAILY PRN
Status: DISCONTINUED | OUTPATIENT
Start: 2025-01-30 | End: 2025-01-31 | Stop reason: HOSPADM

## 2025-01-30 RX ORDER — SODIUM CHLORIDE 9 MG/ML
40 INJECTION, SOLUTION INTRAVENOUS AS NEEDED
Status: DISCONTINUED | OUTPATIENT
Start: 2025-01-30 | End: 2025-01-30 | Stop reason: HOSPADM

## 2025-01-30 RX ORDER — ACETAMINOPHEN 325 MG/1
650 TABLET ORAL ONCE
Status: COMPLETED | OUTPATIENT
Start: 2025-01-30 | End: 2025-01-30

## 2025-01-30 RX ORDER — ADENOSINE 3 MG/ML
12 INJECTION, SOLUTION INTRAVENOUS ONCE
Status: COMPLETED | OUTPATIENT
Start: 2025-01-30 | End: 2025-01-30

## 2025-01-30 RX ORDER — AMOXICILLIN 250 MG
2 CAPSULE ORAL 2 TIMES DAILY PRN
Status: DISCONTINUED | OUTPATIENT
Start: 2025-01-30 | End: 2025-01-31 | Stop reason: HOSPADM

## 2025-01-30 RX ORDER — SODIUM CHLORIDE 0.9 % (FLUSH) 0.9 %
10 SYRINGE (ML) INJECTION AS NEEDED
Status: DISCONTINUED | OUTPATIENT
Start: 2025-01-30 | End: 2025-01-31 | Stop reason: HOSPADM

## 2025-01-30 RX ORDER — ROPINIROLE 1 MG/1
1 TABLET, FILM COATED ORAL NIGHTLY PRN
Status: DISCONTINUED | OUTPATIENT
Start: 2025-01-30 | End: 2025-01-31 | Stop reason: HOSPADM

## 2025-01-30 RX ORDER — ACETAMINOPHEN 650 MG/1
650 SUPPOSITORY RECTAL EVERY 4 HOURS PRN
Status: DISCONTINUED | OUTPATIENT
Start: 2025-01-30 | End: 2025-01-31 | Stop reason: HOSPADM

## 2025-01-30 RX ORDER — SODIUM CHLORIDE 9 MG/ML
75 INJECTION, SOLUTION INTRAVENOUS CONTINUOUS
Status: DISPENSED | OUTPATIENT
Start: 2025-01-30 | End: 2025-01-30

## 2025-01-30 RX ADMIN — Medication 200 MCG: at 15:04

## 2025-01-30 RX ADMIN — Medication 100 MCG: at 15:06

## 2025-01-30 RX ADMIN — Medication 100 MCG: at 14:46

## 2025-01-30 RX ADMIN — ROPINIROLE HYDROCHLORIDE 1 MG: 1 TABLET, FILM COATED ORAL at 20:34

## 2025-01-30 RX ADMIN — FENTANYL CITRATE 50 MCG: 50 INJECTION, SOLUTION INTRAMUSCULAR; INTRAVENOUS at 14:37

## 2025-01-30 RX ADMIN — Medication 100 MCG: at 15:12

## 2025-01-30 RX ADMIN — ACETAMINOPHEN 650 MG: 325 TABLET ORAL at 15:48

## 2025-01-30 RX ADMIN — PROPOFOL 75 MCG/KG/MIN: 10 INJECTION, EMULSION INTRAVENOUS at 14:37

## 2025-01-30 RX ADMIN — AMIODARONE HYDROCHLORIDE 150 MG: 1.5 INJECTION, SOLUTION INTRAVENOUS at 03:22

## 2025-01-30 RX ADMIN — Medication 200 MCG: at 14:57

## 2025-01-30 RX ADMIN — ENOXAPARIN SODIUM 40 MG: 100 INJECTION SUBCUTANEOUS at 20:34

## 2025-01-30 RX ADMIN — SODIUM CHLORIDE: 9 INJECTION, SOLUTION INTRAVENOUS at 14:33

## 2025-01-30 RX ADMIN — ADENOSINE 12 MG: 3 INJECTION INTRAVENOUS at 04:28

## 2025-01-30 RX ADMIN — AMIODARONE HYDROCHLORIDE 1 MG/MIN: 1.8 INJECTION, SOLUTION INTRAVENOUS at 03:46

## 2025-01-30 RX ADMIN — Medication 10 ML: at 20:37

## 2025-01-30 NOTE — ED PROVIDER NOTES
HPI:     Patient is a 55-year-old white female with known history of SVT who is supposed to be have been an ablation done by Dr. Renner On 2/6/2025 presents with palpitations of SVT.  Patient took 300 mg of metoprolol prior to arrival.  She states that initially was in 190s and almost 200s.  Currently is 168.  Patient states last time she is at [facility] they tried multiple different medications to cannot get her converted including electrical conversion at 50 and 100 J which did not work eventually she converted on her own here.  She saw Dr. Renner who then set up the appointment.    REVIEW OF SYSTEMS  CONSTITUTIONAL:  No complaints of fever, chills,or weakness  EYES:  No complaints of discharge   ENT: No complaints of sore throat or ear pain  CARDIOVASCULAR: Positive for palpitations   RESPIRATORY: Positive for shortness of breath   GI:  No complaints of abdominal pain, nausea, vomiting, or diarrhea  MUSCULOSKELETAL:  No complaints of back pain  SKIN:  No complaints of rash  NEUROLOGIC:  No complaints of headache, focal weakness, or sensory changes  ENDOCRINE:  No complaints of polyuria or polydipsia  LYMPHATIC:  No complaints of swollen glands  GENITOURINARY: No complaints of urinary frequency or hematuria        PAST MEDICAL HISTORY  Past Medical History:   Diagnosis Date    Arthritis     Asthma     Bunion     Left foot    Callus     Left and right great toe    Chronic fatigue     Colon polyp 1990s    Depression     Diabetes mellitus     Essential hypertension 10/25/2023    Generalized anxiety disorder     Goiter 05/22/2018    Hypercalcemia     Hyperparathyroidism     Hypothyroidism     Ingrown toenail     Hx of    Irritable bowel syndrome     Kidney stone     Lactose intolerance 1980s    Melanoma of back     Multinodular goiter     Obesity     Onychomycosis     Panic     Pelvis fracture 1996    did not require surgery - healed on its own    Plantar fasciitis     Hx of,.corrected approx 10yrs ago    PONV  (postoperative nausea and vomiting)     Sleep apnea     supposed to use machine but hasn't gotten it yet    Supraventricular tachycardia     Tendonitis     Vitamin D deficiency        FAMILY HISTORY  Family History   Problem Relation Age of Onset    No Known Problems Mother     Hypertension Father     Diabetes Father     Breast cancer Paternal Aunt     Diabetes Paternal Grandmother     Colon cancer Neg Hx     Colon polyps Neg Hx        SOCIAL HISTORY  Social History     Socioeconomic History    Marital status: Single   Tobacco Use    Smoking status: Never    Smokeless tobacco: Never   Vaping Use    Vaping status: Never Used   Substance and Sexual Activity    Alcohol use: Never    Drug use: Never    Sexual activity: Never       IMMUNIZATION HISTORY  Deferred to primary care physician.    SURGICAL HISTORY  Past Surgical History:   Procedure Laterality Date    CHOLECYSTECTOMY      COLONOSCOPY      Approx 5-10 yrs ago    COLONOSCOPY N/A 05/06/2024    Procedure: COLONOSCOPY WITH ANESTHESIA;  Surgeon: Melissa Smiley MD;  Location: [facility] ENDOSCOPY;  Service: Gastroenterology;  Laterality: N/A;  preop; screening   postop polyps   PCP Polo Lindo    ENDOSCOPY  09/28/2004    esophagus is normal-Hiatal Hernia    HAND SURGERY Right     JOINT REPLACEMENT Left     knee    TONSILLECTOMY AND ADENOIDECTOMY      UPPER GASTROINTESTINAL ENDOSCOPY      Approx 20 yrs ago    WIDE EXCISION LESION/MASS TRUNK Left 10/26/2020    Procedure: WIDE EXCISION MELANOMA LEFT BACK AND LEFT AXILLARY SENTINEL LYMPH NODE BIOPSY WITH SPY;  Surgeon: Markell Gresham MD;  Location: [facility] OR;  Service: General;  Laterality: Left;       CURRENT MEDICATIONS    Current Facility-Administered Medications:     amiodarone 360 mg in 200 mL D5W infusion, 1 mg/min, Intravenous, Continuous, Held at 01/30/25 0441 **FOLLOWED BY** amiodarone 360 mg in 200 mL D5W infusion, 0.5 mg/min, Intravenous, Continuous, Musa Blackman Jr., MD    sodium chloride 0.9 % flush  "10 mL, 10 mL, Intravenous, PRN, Musa Blackman Jr., MD    Current Outpatient Medications:     albuterol sulfate  (90 Base) MCG/ACT inhaler, Inhale 2 puffs Every 4 (Four) Hours As Needed for Wheezing., Disp: , Rfl:     cetirizine (zyrTEC) 10 MG tablet, Take 1 tablet by mouth Daily., Disp: , Rfl:     FLUoxetine (PROzac) 40 MG capsule, Take 1 capsule by mouth Daily., Disp: , Rfl:     metoprolol succinate XL (TOPROL-XL) 50 MG 24 hr tablet, Take 1 tablet by mouth Daily., Disp: , Rfl:     Tirzepatide (Mounjaro) 15 MG/0.5ML solution pen-injector pen, Inject 0.5 mL under the skin into the appropriate area as directed 1 (One) Time Per Week., Disp: , Rfl:     ALLERGIES  Allergies   Allergen Reactions    Lamisil [Terbinafine] Other (See Comments)     Other reaction(s): Other (See Comments)  Hair loss and mood change  Hair loss and mood change    Buspirone Rash           Cardiac exam    VITAL SIGNS:  /68   Pulse 81   Temp 98 °F (36.7 °C)   Resp 13   Ht 162.6 cm (64\")   Wt 116 kg (256 lb)   SpO2 90%   BMI 43.94 kg/m²     Constitutional: Patient is alert and in no no mild chest distress.  Patient with mild chest discomfort.    ENT: There is a normal pharynx with no acute erythema or exudate and oral mucosa is moist.  Nose is clear with no drainage.  Tympanic membranes intact and nonerythemic    Respiratory: Patient is clear to auscultation bilaterally with no wheezing or rhonchi.  Chest wall is nontender.  There are no external lesions on the chest.  There is no crepitance    Cardiovascular: S1-S2 tachycardic with no murmur    Abdomen: Soft, nontender, and  bowel sounds are normal in all 4 quadrants.  There is no rebound or guarding noted.  There is no abdominal distention or hepatosplenomegaly.    Genitourinary: Patient is voiding appropriately.    Integument: No acute lesions noted and color appears to be normal.    Josh Coma Scale: Total score 15    Neurological: Patient is alert and oriented x4 " and no acute findings noted.  Speech is fluent and cognition is normal.  No evidence of acute CVA.  Cranial nerves II through XII intact.  Patient with normal motor function as well as reflexes and sensation      RADIOLOGY/PROCEDURES      XR Chest 1 View    (Results Pending)          FUTURE APPOINTMENTS     Future Appointments   Date Time Provider Department Center   2/3/2025 10:00 AM PAT ROOM COU PAD PAD COU PAD   7/14/2025  8:00 AM Nayla Peguero APRN [facility] POD PAD PAD          EKG (reviewed and interpreted by me): Sinus tachycardia with a right bundle branch block.      HEART SCORE     Patient history  1      Moderately suspicious (1 point)    2   ECG       Nonspecific repolarization disturbance (1 point)    3   Patient age       Between 45 and 65 (1 point)    4   Risk factors (Hypercholesterolemia, Hypertension, diabetes, smoking, obesity)     More than 3 risk factors or atherosclerosis history (2 points)    5   Troponin       Less than normal limit (0 points)     6     TOTAL RISK NUMBER: 5    The three risk categories are described below:  Heart score MACE risk Recommendation  0 - 3 Low (1.7%) Discharge can be an option.  4 - 6 Intermediate (20.3%) Clinical observation and further investigations.  7 - 10 High (72.2%) Immediate invasive treatment        COURSE & MEDICAL DECISION MAKING       Patient's partial differential diagnosis can include:    Unstable angina, angina, non-STEMI, arrhythmia, pneumothorax, pulmonary embolism, electrolyte abnormality,  Prinzmetal angina, costochondritis pleurisy, pleural effusion, pulmonary edema, panic attack, esophageal spasm, GERD, gastritis, chest spasms, and others    Amiodarone bolus and drip was started due to the patient's tachycardia.  Patient is already taken 300 mg of metoprolol tartrate.  Due to her pressure already being 98 systolic do not feel comfortable with giving another dose of IV metoprolol to slow her rate.  Also with donald Ko working more also on blood  pressure to feel that amiodarone is the best initial start.  Patient does not want to be electrically converted at this time and is willing to give hemorrhoidal some time and if that fails she is willing to try adenosine at that point.    Despite the bolus of amiodarone and the drip patient's heart rate continued to be 140s and 150s.  Decision made to give adenosine 12 mg IV x 1.  Patient's blood pressure still in the 90s and low 100s thought likely.    After adenosine 12 mg rapid push was given patient converted to a normal sinus rhythm with a rate of 82.  Will call Dr. Renner the electro cardiologist for recommendation.    Spoke with Dr. Renner who recommends admission of the patient to their PCP with him consulting.    Spoke with April nurse practitioner working with Dr. Lindo the patient's PCP who accepts the patient on behalf of him as an admission.  She is aware of the consultation to Dr. Renner.    Will hold the amiodarone drip at this time if it reoccurs we will start it.    Patient's level of risk: Moderate        CRITICAL CARE    CRITICAL CARE: No    CRITICAL CARE TIME: None      Recent Results (from the past 24 hours)   ECG 12 Lead Tachycardia    Collection Time: 01/30/25  2:45 AM   Result Value Ref Range    QT Interval 290 ms    QTC Interval 476 ms   Green Top (Gel)    Collection Time: 01/30/25  3:15 AM   Result Value Ref Range    Extra Tube Hold for add-ons.    Lavender Top    Collection Time: 01/30/25  3:15 AM   Result Value Ref Range    Extra Tube hold for add-on    Red Top    Collection Time: 01/30/25  3:15 AM   Result Value Ref Range    Extra Tube Hold for add-ons.    Light Blue Top    Collection Time: 01/30/25  3:15 AM   Result Value Ref Range    Extra Tube Hold for add-ons.    Protime-INR    Collection Time: 01/30/25  3:15 AM    Specimen: Blood   Result Value Ref Range    Protime 13.6 11.8 - 14.8 Seconds    INR 0.99 0.91 - 1.09   aPTT    Collection Time: 01/30/25  3:15 AM    Specimen: Blood   Result  Value Ref Range    PTT 26.8 24.5 - 36.0 seconds   CBC Auto Differential    Collection Time: 01/30/25  3:15 AM    Specimen: Blood   Result Value Ref Range    WBC 11.92 (H) 3.40 - 10.80 10*3/mm3    RBC 6.07 (H) 3.77 - 5.28 10*6/mm3    Hemoglobin 16.8 (H) 12.0 - 15.9 g/dL    Hematocrit 50.7 (H) 34.0 - 46.6 %    MCV 83.5 79.0 - 97.0 fL    MCH 27.7 26.6 - 33.0 pg    MCHC 33.1 31.5 - 35.7 g/dL    RDW 12.6 12.3 - 15.4 %    RDW-SD 38.7 37.0 - 54.0 fl    MPV 9.3 6.0 - 12.0 fL    Platelets 439 140 - 450 10*3/mm3    Neutrophil % 60.3 42.7 - 76.0 %    Lymphocyte % 29.9 19.6 - 45.3 %    Monocyte % 8.3 5.0 - 12.0 %    Eosinophil % 0.6 0.3 - 6.2 %    Basophil % 0.5 0.0 - 1.5 %    Immature Grans % 0.4 0.0 - 0.5 %    Neutrophils, Absolute 7.18 (H) 1.70 - 7.00 10*3/mm3    Lymphocytes, Absolute 3.57 (H) 0.70 - 3.10 10*3/mm3    Monocytes, Absolute 0.99 (H) 0.10 - 0.90 10*3/mm3    Eosinophils, Absolute 0.07 0.00 - 0.40 10*3/mm3    Basophils, Absolute 0.06 0.00 - 0.20 10*3/mm3    Immature Grans, Absolute 0.05 0.00 - 0.05 10*3/mm3    nRBC 0.0 0.0 - 0.2 /100 WBC   Comprehensive Metabolic Panel    Collection Time: 01/30/25  3:25 AM    Specimen: Blood   Result Value Ref Range    Glucose 155 (H) 65 - 99 mg/dL    BUN 13 6 - 20 mg/dL    Creatinine 0.72 0.57 - 1.00 mg/dL    Sodium 137 136 - 145 mmol/L    Potassium 3.5 3.5 - 5.2 mmol/L    Chloride 101 98 - 107 mmol/L    CO2 18.0 (L) 22.0 - 29.0 mmol/L    Calcium 9.4 8.6 - 10.5 mg/dL    Total Protein 8.6 (H) 6.0 - 8.5 g/dL    Albumin 4.3 3.5 - 5.2 g/dL    ALT (SGPT) 33 1 - 33 U/L    AST (SGOT) 25 1 - 32 U/L    Alkaline Phosphatase 82 39 - 117 U/L    Total Bilirubin 0.5 0.0 - 1.2 mg/dL    Globulin 4.3 gm/dL    A/G Ratio 1.0 g/dL    BUN/Creatinine Ratio 18.1 7.0 - 25.0    Anion Gap 18.0 (H) 5.0 - 15.0 mmol/L    eGFR 100.1 >60.0 mL/min/1.73   High Sensitivity Troponin T    Collection Time: 01/30/25  3:25 AM    Specimen: Blood   Result Value Ref Range    HS Troponin T 11 <14 ng/L   BNP    Collection  Time: 01/30/25  3:25 AM    Specimen: Blood   Result Value Ref Range    proBNP 90.3 0.0 - 900.0 pg/mL   Magnesium    Collection Time: 01/30/25  3:25 AM    Specimen: Blood   Result Value Ref Range    Magnesium 1.9 1.6 - 2.6 mg/dL   ECG 12 Lead Rhythm Change    Collection Time: 01/30/25  4:35 AM   Result Value Ref Range    QT Interval 390 ms    QTC Interval 455 ms         Old charts were reviewed per [facility] EMR.  Pertinent details are summarized above.  All laboratory, radiologic, and EKG studies that were performed in the Emergency Department were a necessary part of the evaluation needed to exclude unstable or  emergent medical conditions.     Patient was hemodynamically and neurologically stable in the ED.   Pertinent studies were reviewed as above.     The patient received:  Medications   sodium chloride 0.9 % flush 10 mL (has no administration in time range)   amiodarone 360 mg in 200 mL D5W infusion (0 mg/min Intravenous Hold 1/30/25 0441)     Followed by   amiodarone 360 mg in 200 mL D5W infusion (has no administration in time range)   amiodarone 150 mg in 100 mL D5W (loading dose) (0 mg Intravenous Stopped 1/30/25 0337)   adenosine (ADENOCARD) injection 12 mg (12 mg Intravenous Given 1/30/25 0428)            ED Disposition       ED Disposition   Intended Admit    Condition   --    Comment   --                 Dragon disclaimer:  Part of this note may be an electronic transcription/translation of spoken language to printed text using the Dragon Dictation System.    I have reviewed the patient’s prescription history via a prescription monitoring program.  This information is consistent with my knowledge of the patient’s controlled substance use history.    Patient evaluated during Coronavirus Pandemic. Isolation practices followed according to [facility] policy.     FINAL IMPRESSION   Diagnosis Plan   1. SVT (supraventricular tachycardia)        2. Sinus tachycardia              Musa Blackman Jr, MD       Musa Blackman Jr., MD  01/30/25 0279

## 2025-01-30 NOTE — Clinical Note
Hemostasis started on the right femoral vein. Figure 8 suturing was used in achieving hemostasis. Closure device deployed in the vessel. Hemostasis achieved successfully. Closure device additional comment: 8Fr

## 2025-01-30 NOTE — CONSULTS
"EP CONSULT NOTE    Subjective        History of Present Illness      EP Problems:  1.  Sustained SVT     Cardiology Problems:  1.  Hypertension     Medical Problems:  1.  Obesity  2.  Asthma  3.  Osteoarthritis  4.  Type 2 diabetes  5.  Hypothyroidism  6.  Generalized anxiety  7.  Multinodular goiter  8.  Obstructive sleep apnea    Arminda Mccloud is a 55 y.o. female with problem list as above for whom EP is consulted regarding sustained SVT.  She has had SVT in the past and has been responsive to adenosine.  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  She ultimately required cardioversion for that episode this time, she presents to the hospital after having another episode of tachycardia at home.  She tried to take a high dose of metoprolol which was ineffective in terminating the episode.  She was given amiodarone and diltiazem here in the hospital without effect.  She ultimately received 4 mg of IV adenosine with successful termination of tachycardia.    Objective   Vital Signs:  /66   Pulse 80   Temp 98 °F (36.7 °C)   Resp 20   Ht 162.6 cm (64\")   Wt 116 kg (256 lb)   SpO2 95%   BMI 43.94 kg/m²   Estimated body mass index is 43.94 kg/m² as calculated from the following:    Height as of this encounter: 162.6 cm (64\").    Weight as of this encounter: 116 kg (256 lb).      Physical Exam  Vitals reviewed.   Constitutional:       Appearance: Normal appearance.   Cardiovascular:      Rate and Rhythm: Normal rate and regular rhythm.      Pulses: Normal pulses.      Heart sounds: Normal heart sounds. No murmur heard.  Pulmonary:      Effort: Pulmonary effort is normal. No respiratory distress.      Breath sounds: Normal breath sounds.   Skin:     General: Skin is warm and dry.   Neurological:      General: No focal deficit present.      Mental Status: She is alert.   Psychiatric:         Mood and Affect: Mood " normal.         Judgment: Judgment normal.          Result Review :  The following data was reviewed by: Jackelyn Renner MD on 01/30/2025:  CMP          1/26/2025    10:25 1/30/2025    03:25   CMP   Glucose 97  155    BUN 7  13    Creatinine 0.51  0.72    EGFR 111.8  100.1    Sodium 139  137    Potassium 3.8  3.5    Chloride 104  101    Calcium 8.7  9.4    Total Protein 7.4  8.6    Albumin 3.8  4.3    Globulin 3.6  4.3    Total Bilirubin 0.3  0.5    Alkaline Phosphatase 71  82    AST (SGOT) 19  25    ALT (SGPT) 27  33    Albumin/Globulin Ratio 1.1  1.0    BUN/Creatinine Ratio 13.7  18.1    Anion Gap 9.0  18.0      CBC          1/26/2025    10:25 1/30/2025    03:15   CBC   WBC 10.96  11.92    RBC 5.01  6.07    Hemoglobin 14.3  16.8    Hematocrit 43.6  50.7    MCV 87.0  83.5    MCH 28.5  27.7    MCHC 32.8  33.1    RDW 13.0  12.6    Platelets 311  439      TSH          1/26/2025    10:25 1/30/2025    03:25   TSH   TSH 0.360  1.400      Troponin 71 (11)    ECG from presentation: SVT, right bundle branch block, mild ST depressions    Chest x-ray from today directly visualized independently reviewed: Normal pericardial silhouette, no infiltrates or pulmonary edema         Assessment and Plan   Problems:  Sustained SVT  Type II NSTEMI secondary to sustained SVT  Intermittent right bundle branch block    Arminda Mccloud is a 55 y.o. female with problem list as above for whom EP is consulted regarding sustained SVT, type II NSTEMI.  I do suspected her troponin elevation is secondary to the SVT episode.  She did have symptoms of neck and jaw pain suggestive of possible demand ischemia.  Despite this, my overall suspicion for underlying CAD is quite low.  The episode is overall consistent with her known history of sustained SVT.  We will see if she can be added onto the inpatient ablation schedule in an expedited manner; otherwise she already has outpatient ablation scheduled which may still be reasonable.    Plan:  -Will check  Cath Lab schedule to see if ablation can be expedited  -Stop amiodarone  -Hold on additional beta-blockers for now  -No interventions for type II NSTEMI as it does appear to be clearly tachycardia mediated and not suggestive of coronary plaque rupture               Part of this note may be an electronic transcription/translation of spoken language to printed text using the Dragon Dictation System.

## 2025-01-30 NOTE — Clinical Note
Hemostasis started on the right femoral vein. Figure 8 suturing was used in achieving hemostasis. Closure device deployed in the vessel. Hemostasis achieved successfully. Closure device additional comment: 7Fr

## 2025-01-30 NOTE — Clinical Note
A 10 fr sheath was successfully inserted with ultrasound guidance into the right femoral vein. Sheath insertion not delayed.

## 2025-01-30 NOTE — ANESTHESIA PREPROCEDURE EVALUATION
Anesthesia Evaluation     Patient summary reviewed   history of anesthetic complications:  PONV  NPO Solid Status: > 8 hours  NPO Liquid Status: > 8 hours           Airway   Mallampati: I  TM distance: >3 FB  Neck ROM: full  No difficulty expected  Dental          Pulmonary    (+) asthma,sleep apnea (not using cpap)  (-) not a smoker  Cardiovascular   Exercise tolerance: good (4-7 METS)    Patient on routine beta blocker and Beta blocker given within 24 hours of surgery    (+) dysrhythmias (SVT--needed adenosine) Tachycardia  (-) hypertension      Neuro/Psych  (+) psychiatric history Depression  (-) seizures, TIA, CVA  GI/Hepatic/Renal/Endo    (+) obesity, morbid obesity, diabetes mellitus type 2, thyroid problem   (-) liver disease, no renal disease    Musculoskeletal     Abdominal    Substance History      OB/GYN          Other   arthritis,   history of cancer (melanoma back) active    ROS/Med Hx Other: Svt 4 dyaas ago   sent home for outpt ablation  svt this am  glp 1 taken one week ago                    Anesthesia Plan    ASA 3     MAC     intravenous induction     Anesthetic plan, risks, benefits, and alternatives have been provided, discussed and informed consent has been obtained with: patient.

## 2025-01-30 NOTE — Clinical Note
Hemostasis started on the right femoral vein. Figure 8 suturing was used in achieving hemostasis. Closure device deployed in the vessel. Hemostasis achieved successfully. Closure device additional comment: 10 Fr

## 2025-01-30 NOTE — PLAN OF CARE
Problem: Fall Injury Risk  Goal: Absence of Fall and Fall-Related Injury  Outcome: Progressing  Intervention: Identify and Manage Contributors  Recent Flowsheet Documentation  Taken 1/30/2025 1430 by Lorenzo Nieves RN  Medication Review/Management: medications reviewed  Intervention: Promote Injury-Free Environment  Recent Flowsheet Documentation  Taken 1/30/2025 1600 by Lorenzo Nieves RN  Safety Promotion/Fall Prevention: patient off unit  Taken 1/30/2025 1503 by Lorenzo Nieves RN  Safety Promotion/Fall Prevention: patient off unit  Taken 1/30/2025 1430 by Lorenzo Nieves RN  Safety Promotion/Fall Prevention: safety round/check completed     Problem: Pain Acute  Goal: Optimal Pain Control and Function  Outcome: Progressing  Intervention: Prevent or Manage Pain  Recent Flowsheet Documentation  Taken 1/30/2025 1430 by Lorenzo Nieves RN  Medication Review/Management: medications reviewed     Problem: Adult Inpatient Plan of Care  Goal: Plan of Care Review  Outcome: Progressing  Goal: Patient-Specific Goal (Individualized)  Outcome: Progressing  Goal: Absence of Hospital-Acquired Illness or Injury  Outcome: Progressing  Intervention: Identify and Manage Fall Risk  Recent Flowsheet Documentation  Taken 1/30/2025 1600 by Lorenzo Nieves RN  Safety Promotion/Fall Prevention: patient off unit  Taken 1/30/2025 1503 by Lorenzo Nieves RN  Safety Promotion/Fall Prevention: patient off unit  Taken 1/30/2025 1430 by Lorenzo Nieves RN  Safety Promotion/Fall Prevention: safety round/check completed  Intervention: Prevent Skin Injury  Recent Flowsheet Documentation  Taken 1/30/2025 1430 by Lorenzo Nieves RN  Body Position: position changed independently  Intervention: Prevent and Manage VTE (Venous Thromboembolism) Risk  Recent Flowsheet Documentation  Taken 1/30/2025 1430 by Lorenzo Nieves RN  VTE Prevention/Management: (Lovenox) other (see comments)  Goal: Optimal Comfort and Wellbeing  Outcome: Progressing  Intervention: Provide  Person-Centered Care  Recent Flowsheet Documentation  Taken 1/30/2025 1430 by Lorenzo Nieves RN  Trust Relationship/Rapport: care explained  Goal: Readiness for Transition of Care  Outcome: Progressing     Problem: Comorbidity Management  Goal: Blood Pressure in Desired Range  Outcome: Progressing  Intervention: Maintain Blood Pressure Management  Recent Flowsheet Documentation  Taken 1/30/2025 1430 by Loernzo Nieves, RN  Medication Review/Management: medications reviewed   Goal Outcome Evaluation:

## 2025-01-30 NOTE — H&P
History and Physical    Patient:  Arminda Mccloud  MRN: [id number]    CHIEF COMPLAINT: Heart palpitations    History Obtained From: the patient   PCP: Zelalem Lindo MD    HISTORY OF PRESENT ILLNESS:   The patient is a 55 y.o. female who presents with a known history of SVT.  Previously cared for by Dr. Renner having undergone cardioversions and chemical therapies in the past is noted to have significant symptomatic SVT in the emergency department.  She was loaded with amiodarone and started on amiodarone drip and when this failed she was given 12 mg of adenosine.  She has already been evaluated by electrophysiology Dr. Renner to take her for EP study and ablation.  Admitted to my service as her primary care physician.    REVIEW OF SYSTEMS:    Review of Systems   Constitutional: Negative.    HENT: Negative.     Respiratory:  Positive for shortness of breath.    Cardiovascular:  Positive for palpitations.   Gastrointestinal: Negative.    Genitourinary: Negative.    Neurological:  Positive for dizziness.          Past Medical History:  Past Medical History:   Diagnosis Date   • Arthritis    • Asthma    • Bunion     Left foot   • Callus     Left and right great toe   • Chronic fatigue    • Colon polyp 1990s   • Depression    • Diabetes mellitus    • Essential hypertension 10/25/2023   • Generalized anxiety disorder    • Goiter 05/22/2018   • Hypercalcemia    • Hyperparathyroidism    • Hypothyroidism    • Ingrown toenail     Hx of   • Irritable bowel syndrome    • Kidney stone    • Lactose intolerance 1980s   • Melanoma of back    • Multinodular goiter    • Obesity    • Onychomycosis    • Panic    • Pelvis fracture 1996    did not require surgery - healed on its own   • Plantar fasciitis     Hx of,.corrected approx 10yrs ago   • PONV (postoperative nausea and vomiting)    • Sleep apnea     supposed to use machine but hasn't gotten it yet   • Supraventricular tachycardia    • Tendonitis    • Vitamin D deficiency   "      Past Surgical History:  Past Surgical History:   Procedure Laterality Date   • CHOLECYSTECTOMY     • COLONOSCOPY      Approx 5-10 yrs ago   • COLONOSCOPY N/A 05/06/2024    Procedure: COLONOSCOPY WITH ANESTHESIA;  Surgeon: Melissa Smiley MD;  Location: [facility] ENDOSCOPY;  Service: Gastroenterology;  Laterality: N/A;  preop; screening   postop polyps   PCP Polo Lindo   • ENDOSCOPY  09/28/2004    esophagus is normal-Hiatal Hernia   • HAND SURGERY Right    • JOINT REPLACEMENT Left     knee   • TONSILLECTOMY AND ADENOIDECTOMY     • UPPER GASTROINTESTINAL ENDOSCOPY      Approx 20 yrs ago   • WIDE EXCISION LESION/MASS TRUNK Left 10/26/2020    Procedure: WIDE EXCISION MELANOMA LEFT BACK AND LEFT AXILLARY SENTINEL LYMPH NODE BIOPSY WITH SPY;  Surgeon: Markell Gresham MD;  Location: [facility] OR;  Service: General;  Laterality: Left;       Medications Prior to Admission:    (Not in a hospital admission)      Allergies:  Lamisil [terbinafine] and Buspirone    Social History:   Social History     Socioeconomic History   • Marital status: Single   Tobacco Use   • Smoking status: Never   • Smokeless tobacco: Never   Vaping Use   • Vaping status: Never Used   Substance and Sexual Activity   • Alcohol use: Never   • Drug use: Never   • Sexual activity: Never       Family History:   Family History   Problem Relation Age of Onset   • No Known Problems Mother    • Hypertension Father    • Diabetes Father    • Breast cancer Paternal Aunt    • Diabetes Paternal Grandmother    • Colon cancer Neg Hx    • Colon polyps Neg Hx            Physical Exam:    Vitals: /70   Pulse 71   Temp 98 °F (36.7 °C)   Resp 20   Ht 162.6 cm (64\")   Wt 116 kg (256 lb)   SpO2 94%   BMI 43.94 kg/m²   Physical Exam  Vitals and nursing note reviewed. Exam conducted with a chaperone present.   Constitutional:       Appearance: She is obese.   HENT:      Head: Normocephalic and atraumatic.   Eyes:      Pupils: Pupils are equal, round, and " reactive to light.   Cardiovascular:      Rate and Rhythm: Tachycardia present.      Pulses: Normal pulses.      Heart sounds: Normal heart sounds.   Pulmonary:      Effort: Pulmonary effort is normal.      Breath sounds: Normal breath sounds.   Abdominal:      General: Abdomen is flat. Bowel sounds are normal.      Palpations: Abdomen is soft.   Skin:     General: Skin is warm.      Capillary Refill: Capillary refill takes less than 2 seconds.   Neurological:      General: No focal deficit present.      Mental Status: She is alert.       Lab Results (last 24 hours)       Procedure Component Value Units Date/Time    High Sensitivity Troponin T 1Hr [[id number]]  (Abnormal) Collected: 01/30/25 0621    Specimen: Blood Updated: 01/30/25 0650     HS Troponin T 71 ng/L      Troponin T Numeric Delta 60 ng/L     Narrative:      High Sensitive Troponin T Reference Range:  <14.0 ng/L- Negative Female for AMI  <22.0 ng/L- Negative Male for AMI  >=14 - Abnormal Female indicating possible myocardial injury.  >=22 - Abnormal Male indicating possible myocardial injury.   Clinicians would have to utilize clinical acumen, EKG, Troponin, and serial changes to determine if it is an Acute Myocardial Infarction or myocardial injury due to an underlying chronic condition.         TSH Rfx On Abnormal To Free T4 [[id number]]  (Normal) Collected: 01/30/25 0325    Specimen: Blood Updated: 01/30/25 0605     TSH 1.400 uIU/mL     Comprehensive Metabolic Panel [[id number]]  (Abnormal) Collected: 01/30/25 0325    Specimen: Blood Updated: 01/30/25 0359     Glucose 155 mg/dL      BUN 13 mg/dL      Creatinine 0.72 mg/dL      Sodium 137 mmol/L      Potassium 3.5 mmol/L      Comment: Slight hemolysis detected by analyzer. Result may be falsely elevated.        Chloride 101 mmol/L      CO2 18.0 mmol/L      Calcium 9.4 mg/dL      Total Protein 8.6 g/dL      Albumin 4.3 g/dL      ALT (SGPT) 33 U/L      AST (SGOT) 25 U/L      Alkaline Phosphatase 82 U/L       Total Bilirubin 0.5 mg/dL      Globulin 4.3 gm/dL      A/G Ratio 1.0 g/dL      BUN/Creatinine Ratio 18.1     Anion Gap 18.0 mmol/L      eGFR 100.1 mL/min/1.73     Narrative:      GFR Categories in Chronic Kidney Disease (CKD)      GFR Category          GFR (mL/min/1.73)    Interpretation  G1                     90 or greater         Normal or high (1)  G2                      60-89                Mild decrease (1)  G3a                   45-59                Mild to moderate decrease  G3b                   30-44                Moderate to severe decrease  G4                    15-29                Severe decrease  G5                    14 or less           Kidney failure          (1)In the absence of evidence of kidney disease, neither GFR category G1 or G2 fulfill the criteria for CKD.    eGFR calculation 2021 CKD-EPI creatinine equation, which does not include race as a factor    Magnesium [[id number]]  (Normal) Collected: 01/30/25 0325    Specimen: Blood Updated: 01/30/25 0359     Magnesium 1.9 mg/dL     High Sensitivity Troponin T [[id number]]  (Normal) Collected: 01/30/25 0325    Specimen: Blood Updated: 01/30/25 0349     HS Troponin T 11 ng/L     Narrative:      High Sensitive Troponin T Reference Range:  <14.0 ng/L- Negative Female for AMI  <22.0 ng/L- Negative Male for AMI  >=14 - Abnormal Female indicating possible myocardial injury.  >=22 - Abnormal Male indicating possible myocardial injury.   Clinicians would have to utilize clinical acumen, EKG, Troponin, and serial changes to determine if it is an Acute Myocardial Infarction or myocardial injury due to an underlying chronic condition.         BNP [[id number]]  (Normal) Collected: 01/30/25 0325    Specimen: Blood Updated: 01/30/25 0349     proBNP 90.3 pg/mL     Narrative:      This assay is used as an aid in the diagnosis of individuals suspected of having heart failure. It can be used as an aid in the diagnosis of acute decompensated heart failure (ADHF)  in patients presenting with signs and symptoms of ADHF to the emergency department (ED). In addition, NT-proBNP of <300 pg/mL indicates ADHF is not likely.    Age Range Result Interpretation  NT-proBNP Concentration (pg/mL:      <50             Positive            >450                   Gray                 300-450                    Negative             <300    50-75           Positive            >900                  Gray                300-900                  Negative            <300      >75             Positive            >1800                  Gray                300-1800                  Negative            <300    Protime-INR [[id number]]  (Normal) Collected: 01/30/25 0315    Specimen: Blood Updated: 01/30/25 0344     Protime 13.6 Seconds      INR 0.99    aPTT [[id number]]  (Normal) Collected: 01/30/25 0315    Specimen: Blood Updated: 01/30/25 0344     PTT 26.8 seconds     CBC & Differential [[id number]]  (Abnormal) Collected: 01/30/25 0315    Specimen: Blood Updated: 01/30/25 0336    Narrative:      The following orders were created for panel order CBC & Differential.  Procedure                               Abnormality         Status                     ---------                               -----------         ------                     CBC Auto Differential[[id number]]        Abnormal            Final result                 Please view results for these tests on the individual orders.    CBC Auto Differential [[id number]]  (Abnormal) Collected: 01/30/25 0315    Specimen: Blood Updated: 01/30/25 0336     WBC 11.92 10*3/mm3      RBC 6.07 10*6/mm3      Hemoglobin 16.8 g/dL      Hematocrit 50.7 %      MCV 83.5 fL      MCH 27.7 pg      MCHC 33.1 g/dL      RDW 12.6 %      RDW-SD 38.7 fl      MPV 9.3 fL      Platelets 439 10*3/mm3      Neutrophil % 60.3 %      Lymphocyte % 29.9 %      Monocyte % 8.3 %      Eosinophil % 0.6 %      Basophil % 0.5 %      Immature Grans % 0.4 %      Neutrophils, Absolute 7.18 10*3/mm3       Lymphocytes, Absolute 3.57 10*3/mm3      Monocytes, Absolute 0.99 10*3/mm3      Eosinophils, Absolute 0.07 10*3/mm3      Basophils, Absolute 0.06 10*3/mm3      Immature Grans, Absolute 0.05 10*3/mm3      nRBC 0.0 /100 WBC     [place] Draw [[id number]] Collected: 01/30/25 0315    Specimen: Blood Updated: 01/30/25 0330    Narrative:      The following orders were created for panel order [place] Draw.  Procedure                               Abnormality         Status                     ---------                               -----------         ------                     Green Top (Gel)[[id number]]                                  Final result               Lavender Top[[id number]]                                     Final result               Red Top[[id number]]                                          Final result               Light Blue Top[[id number]]                                   Final result                 Please view results for these tests on the individual orders.    Green Top (Gel) [[id number]] Collected: 01/30/25 0315    Specimen: Blood Updated: 01/30/25 0330     Extra Tube Hold for add-ons.     Comment: Auto resulted.       Lavender Top [[id number]] Collected: 01/30/25 0315    Specimen: Blood Updated: 01/30/25 0330     Extra Tube hold for add-on     Comment: Auto resulted       Red Top [[id number]] Collected: 01/30/25 0315    Specimen: Blood Updated: 01/30/25 0330     Extra Tube Hold for add-ons.     Comment: Auto resulted.       Light Blue Top [[id number]] Collected: 01/30/25 0315    Specimen: Blood Updated: 01/30/25 0330     Extra Tube Hold for add-ons.     Comment: Auto resulted                -----------------------------------------------------------------    Radiology:     XR Chest 1 View    Result Date: 1/30/2025  EXAMINATION: XR CHEST 1 VW-  1/30/2025 2:33 AM  HISTORY: Palpitation  A frontal projection of the chest is compared with the previous study dated 7/19/2018.  The lungs are moderately  well-expanded.  There are chronic appearing interstitial changes in the lungs bilaterally with a few scattered granulomas similar to the previous study.  There is no pleural effusion, pulmonary congestion or pneumothorax.  The cardiac silhouette is moderately enlarged similar to the previous study. This may partly be due to the AP projection.  No acute bony abnormality.      1. No active cardiopulmonary disease.   This report was signed and finalized on 1/30/2025 7:00 AM by Dr. Adis Awan MD.        Assessment and Plan     Primary Problem:  SVT (supraventricular tachycardia)  Polycythemia  Hyperglycemia without history of type 2 diabetes mellitus  Hypothyroidism  Hospital Problem list:    SVT (supraventricular tachycardia)      PMH:  Past Medical History:   Diagnosis Date   • Arthritis    • Asthma    • Bunion     Left foot   • Callus     Left and right great toe   • Chronic fatigue    • Colon polyp 1990s   • Depression    • Diabetes mellitus    • Essential hypertension 10/25/2023   • Generalized anxiety disorder    • Goiter 05/22/2018   • Hypercalcemia    • Hyperparathyroidism    • Hypothyroidism    • Ingrown toenail     Hx of   • Irritable bowel syndrome    • Kidney stone    • Lactose intolerance 1980s   • Melanoma of back    • Multinodular goiter    • Obesity    • Onychomycosis    • Panic    • Pelvis fracture 1996    did not require surgery - healed on its own   • Plantar fasciitis     Hx of,.corrected approx 10yrs ago   • PONV (postoperative nausea and vomiting)    • Sleep apnea     supposed to use machine but hasn't gotten it yet   • Supraventricular tachycardia    • Tendonitis    • Vitamin D deficiency        Treatment Plan:  Admit to medical service for on the fourth floor  Telemetry monitoring  Continue amiodarone  Electrophysiology consultation and planned ablation    Disposition: Home    Zelalem Lindo MD 1/30/2025 08:28 CST

## 2025-01-30 NOTE — ANESTHESIA POSTPROCEDURE EVALUATION
Patient: Arminda Mccloud    Procedure Summary       Date: 01/30/25 Room / Location: [facility]  /  [facility] CATH INVASIVE LOCATION    Anesthesia Start: 1433 Anesthesia Stop: 1530    Procedure: Ablation SVT Diagnosis:       Sustained SVT      (SVT (65283))    Providers: Jackelyn Renner MD Provider: Dagoberto Suazo CRNA    Anesthesia Type: MAC ASA Status: 3            Anesthesia Type: MAC    Vitals  No vitals data found for the desired time range.          Post Anesthesia Care and Evaluation    Patient location during evaluation: PHASE II  Patient participation: complete - patient participated  Level of consciousness: awake  Pain management: adequate    Airway patency: patent  Anesthetic complications: No anesthetic complications  PONV Status: none  Cardiovascular status: acceptable  Respiratory status: acceptable  Hydration status: acceptable

## 2025-01-31 VITALS
TEMPERATURE: 98.2 F | WEIGHT: 257.8 LBS | BODY MASS INDEX: 44.01 KG/M2 | HEART RATE: 65 BPM | SYSTOLIC BLOOD PRESSURE: 105 MMHG | DIASTOLIC BLOOD PRESSURE: 58 MMHG | OXYGEN SATURATION: 94 % | HEIGHT: 64 IN | RESPIRATION RATE: 16 BRPM

## 2025-01-31 LAB
ANION GAP SERPL CALCULATED.3IONS-SCNC: 9 MMOL/L (ref 5–15)
BASOPHILS # BLD AUTO: 0.03 10*3/MM3 (ref 0–0.2)
BASOPHILS NFR BLD AUTO: 0.4 % (ref 0–1.5)
BUN SERPL-MCNC: 10 MG/DL (ref 6–20)
BUN/CREAT SERPL: 16.4 (ref 7–25)
CALCIUM SPEC-SCNC: 8.7 MG/DL (ref 8.6–10.5)
CHLORIDE SERPL-SCNC: 103 MMOL/L (ref 98–107)
CO2 SERPL-SCNC: 25 MMOL/L (ref 22–29)
CREAT SERPL-MCNC: 0.61 MG/DL (ref 0.57–1)
DEPRECATED RDW RBC AUTO: 41.4 FL (ref 37–54)
EGFRCR SERPLBLD CKD-EPI 2021: 107.1 ML/MIN/1.73
EOSINOPHIL # BLD AUTO: 0.1 10*3/MM3 (ref 0–0.4)
EOSINOPHIL NFR BLD AUTO: 1.2 % (ref 0.3–6.2)
ERYTHROCYTE [DISTWIDTH] IN BLOOD BY AUTOMATED COUNT: 13.1 % (ref 12.3–15.4)
GLUCOSE SERPL-MCNC: 90 MG/DL (ref 65–99)
HCT VFR BLD AUTO: 44.1 % (ref 34–46.6)
HGB BLD-MCNC: 14.2 G/DL (ref 12–15.9)
IMM GRANULOCYTES # BLD AUTO: 0.02 10*3/MM3 (ref 0–0.05)
IMM GRANULOCYTES NFR BLD AUTO: 0.2 % (ref 0–0.5)
LYMPHOCYTES # BLD AUTO: 2.72 10*3/MM3 (ref 0.7–3.1)
LYMPHOCYTES NFR BLD AUTO: 33.8 % (ref 19.6–45.3)
MAGNESIUM SERPL-MCNC: 2 MG/DL (ref 1.6–2.6)
MCH RBC QN AUTO: 28 PG (ref 26.6–33)
MCHC RBC AUTO-ENTMCNC: 32.2 G/DL (ref 31.5–35.7)
MCV RBC AUTO: 86.8 FL (ref 79–97)
MONOCYTES # BLD AUTO: 0.72 10*3/MM3 (ref 0.1–0.9)
MONOCYTES NFR BLD AUTO: 8.9 % (ref 5–12)
NEUTROPHILS NFR BLD AUTO: 4.46 10*3/MM3 (ref 1.7–7)
NEUTROPHILS NFR BLD AUTO: 55.5 % (ref 42.7–76)
NRBC BLD AUTO-RTO: 0 /100 WBC (ref 0–0.2)
PHOSPHATE SERPL-MCNC: 3.8 MG/DL (ref 2.5–4.5)
PLATELET # BLD AUTO: 297 10*3/MM3 (ref 140–450)
PMV BLD AUTO: 9.7 FL (ref 6–12)
POTASSIUM SERPL-SCNC: 3.7 MMOL/L (ref 3.5–5.2)
RBC # BLD AUTO: 5.08 10*6/MM3 (ref 3.77–5.28)
SODIUM SERPL-SCNC: 137 MMOL/L (ref 136–145)
WBC NRBC COR # BLD AUTO: 8.05 10*3/MM3 (ref 3.4–10.8)

## 2025-01-31 PROCEDURE — 25010000002 ENOXAPARIN PER 10 MG: Performed by: STUDENT IN AN ORGANIZED HEALTH CARE EDUCATION/TRAINING PROGRAM

## 2025-01-31 PROCEDURE — 99232 SBSQ HOSP IP/OBS MODERATE 35: CPT | Performed by: STUDENT IN AN ORGANIZED HEALTH CARE EDUCATION/TRAINING PROGRAM

## 2025-01-31 PROCEDURE — 85025 COMPLETE CBC W/AUTO DIFF WBC: CPT | Performed by: STUDENT IN AN ORGANIZED HEALTH CARE EDUCATION/TRAINING PROGRAM

## 2025-01-31 PROCEDURE — 84100 ASSAY OF PHOSPHORUS: CPT | Performed by: STUDENT IN AN ORGANIZED HEALTH CARE EDUCATION/TRAINING PROGRAM

## 2025-01-31 PROCEDURE — 80048 BASIC METABOLIC PNL TOTAL CA: CPT | Performed by: STUDENT IN AN ORGANIZED HEALTH CARE EDUCATION/TRAINING PROGRAM

## 2025-01-31 PROCEDURE — 83735 ASSAY OF MAGNESIUM: CPT | Performed by: STUDENT IN AN ORGANIZED HEALTH CARE EDUCATION/TRAINING PROGRAM

## 2025-01-31 RX ORDER — PRAMIPEXOLE DIHYDROCHLORIDE 0.5 MG/1
0.5 TABLET ORAL NIGHTLY
Qty: 30 TABLET | Refills: 1 | Status: SHIPPED | OUTPATIENT
Start: 2025-01-31

## 2025-01-31 RX ORDER — ASPIRIN 325 MG
325 TABLET, DELAYED RELEASE (ENTERIC COATED) ORAL DAILY
Status: DISCONTINUED | OUTPATIENT
Start: 2025-01-31 | End: 2025-01-31 | Stop reason: HOSPADM

## 2025-01-31 RX ORDER — ASPIRIN 325 MG
325 TABLET, DELAYED RELEASE (ENTERIC COATED) ORAL DAILY
Qty: 30 TABLET | Refills: 3 | Status: SHIPPED | OUTPATIENT
Start: 2025-02-01

## 2025-01-31 RX ADMIN — ASPIRIN 325 MG: 325 TABLET, COATED ORAL at 11:06

## 2025-01-31 RX ADMIN — ENOXAPARIN SODIUM 40 MG: 100 INJECTION SUBCUTANEOUS at 08:38

## 2025-01-31 RX ADMIN — Medication 10 ML: at 08:39

## 2025-01-31 RX ADMIN — FLUOXETINE HYDROCHLORIDE 40 MG: 20 CAPSULE ORAL at 09:17

## 2025-01-31 NOTE — DISCHARGE SUMMARY
Hospital Discharge Summary    Arminda Mccloud  :  1970  MRN:  [id number]    Admit date:  2025  Discharge date:  2025    Admitting Physician:    Zelalem Lindo MD    Discharge Diagnoses:      SVT (supraventricular tachycardia)    Sustained SVT      Hospital Course:   The patient is a 55 y.o. female who presents with a known history of SVT.  Previously cared for by Dr. Renner having undergone cardioversions and chemical therapies in the past is noted to have significant symptomatic SVT in the emergency department.  She was loaded with amiodarone and started on amiodarone drip and when this failed she was given 12 mg of adenosine.  She has already been evaluated by electrophysiology Dr. Renner to take her for EP study and ablation.  Admitted to my service as her primary care physician.  She underwent EP study and subsequent cardiac ablation per Dr. Renner.  Postprocedure she converted to normal sinus rhythm and remained the same for overnight monitoring.  She is asymptomatic rate for discharge home the next morning.    The patient was admitted for the above noted medical/surgical issues. Please see daily progress note for further details concerning their stay. The patient improved throughout their stay and reached maximum medical improvement on the day of discharge. The patient/family agree with the treatment plan as outlined above. All questions concerning their stay were answered prior to discharge. They understand the importance of follow up concerning any abnormal test results.     Physical Exam  Vitals and nursing note reviewed.   Constitutional:       Appearance: She is obese.   HENT:      Head: Normocephalic and atraumatic.      Nose: Nose normal.      Mouth/Throat:      Mouth: Mucous membranes are moist.   Eyes:      Pupils: Pupils are equal, round, and reactive to light.   Cardiovascular:      Rate and Rhythm: Normal rate and regular rhythm.      Pulses: Normal pulses.      Heart sounds:  Normal heart sounds.   Pulmonary:      Effort: Pulmonary effort is normal.      Breath sounds: Normal breath sounds.   Abdominal:      General: Abdomen is flat. Bowel sounds are normal.      Palpations: Abdomen is soft.   Skin:     General: Skin is warm.      Capillary Refill: Capillary refill takes less than 2 seconds.   Neurological:      Mental Status: She is alert.         Discharge Medications:         Discharge Medications        New Medications        Instructions Start Date   aspirin 325 MG EC tablet   325 mg, Oral, Daily   Start Date: February 1, 2025     pramipexole 0.5 MG tablet  Commonly known as: Mirapex   0.5 mg, Oral, Nightly             Changes to Medications        Instructions Start Date   FLUoxetine 20 MG capsule  Commonly known as: PROzac  What changed:   medication strength  how much to take   60 mg, Oral, Daily             Continue These Medications        Instructions Start Date   albuterol sulfate  (90 Base) MCG/ACT inhaler  Commonly known as: PROVENTIL HFA;VENTOLIN HFA;PROAIR HFA   2 puffs, Inhalation, Every 4 Hours PRN      albuterol (2.5 MG/3ML) 0.083% nebulizer solution  Commonly known as: PROVENTIL   2.5 mg, Nebulization, Every 4 Hours PRN      cetirizine 10 MG tablet  Commonly known as: zyrTEC   10 mg, Oral, Daily      metoprolol succinate XL 50 MG 24 hr tablet  Commonly known as: TOPROL-XL   50 mg, Oral, Daily      Mounjaro 15 MG/0.5ML solution auto-injector  Generic drug: Tirzepatide   15 mg, Subcutaneous, Weekly               Activity: As tolerated    Diet: Cardiac    Consults: Dr. Renner    Significant Diagnostic Studies:    XR Chest 1 View    Result Date: 1/30/2025  1. No active cardiopulmonary disease.   This report was signed and finalized on 1/30/2025 7:00 AM by Dr. Adis Awan MD.            Treatments:   Cardiac ablation    Disposition:   Home    Time spent on discharge including discussion with patient/family, SW, and coordination of care.     Follow up with  Zelalem Lindo MD in 1 week.    Signed:  Zelalem Lindo MD   1/31/2025, 12:22 CST

## 2025-01-31 NOTE — PAYOR COMM NOTE
"1/31/25 [facility] [phone]  [phone]    ER ADMIT ON 1/30/25 OBSERVATION STATUS.    1/31/25 MD CHANGED TO INPATIENT STATUS.    1/31/25 INPATIENT ORDER. FAXING FOR INPATIENT REVIEW WITH INPATIENT DATE 1/31/25.        Arminda Mccloud (55 y.o. Female)       Date of Birth   1970    Social Security Number       Address   [address] [place], [place] [address]    Home Phone   [phone]    MRN   [id number]       [facility]    Marital Status   Single                            Admission Date   1/30/25    Admission Type   Emergency    Admitting Provider   Zelalem Lindo MD    Attending Provider   Zelalem Lindo MD    Department, Room/Bed   [facility] 4B, 435/1       Discharge Date       Discharge Disposition       Discharge Destination                                 Attending Provider: Zelalem Lindo MD    Allergies: Lamisil [Terbinafine], Buspirone    Isolation: None   Infection: None   Code Status: CPR    Ht: 162.6 cm (64\")   Wt: 117 kg (257 lb 12.8 oz)    Admission Cmt: None   Principal Problem: SVT (supraventricular tachycardia) [I47.10]                   Active Insurance as of 1/30/2025       Primary Coverage       Payor Plan Insurance Group Employer/Plan Group    [facility] Epic! [facility] Epic! BLUE [facility] PPO [id number]       Payor Plan Address Payor Plan Phone Number Payor Plan Fax Number Effective Dates    [address] [phone]  1/1/2020 - None Entered    [place] [address]         Subscriber Name Subscriber Birth Date Member ID       ARMINDA MCCLOUD 1970 [id number]                     Emergency Contacts        (Rel.) Home Phone Work Phone Mobile Phone    Radha Mccloud (Mother) [phone] -- --             [facility] Encounter Date/Time: 1/30/2025 [facility]   Hospital Account: [id number]    MRN: [id number]   Patient:  Arminda Mccloud   Contact Serial #: [id number]   SSN:        "   ENCOUNTER             Patient Class: Inpatient   Unit: [address]   Hospital Service: Cardiology     Bed: 435/1   Admitting Provider: Zelalem Lindo MD   Referring Physician:     Attending Provider: Zelalem Lindo MD   Adm Diagnosis: SVT (supraventricular ta*               PATIENT             Name: Arminda Mccloud : 1970 (55 yrs)   Address: [address] [place] [address] RD Sex: Female   City: [place] [address]   County: [place]   Marital Status: SINGLE Ethnicity: NOT                                                                         Race: WHITE   Primary Care Provider: Zelalem Lindo MD Patients Phone: Home Phone: [phone]  Work Phone: [phone]  Mobile Phone: [phone]     EMERGENCY CONTACT   Contact Name Legal Guardian? Relationship to Patient Home Phone Work Phone Mobile Phone   1. Radha Mccloud  2. *No Contact Specified* No    Mother    [phone]                GUARANTOR             Guarantor: Arminda Mccloud     : 1970   Address: [address] [place] [address] Rd Sex: [place] [address]     Relation to Patient: Self       Home Phone: [phone]   Guarantor ID: [id number]       Work Phone: [phone]   GUARANTOR EMPLOYER   Employer: [employer]         Status: FULL TIME   COVERAGE          PRIMARY INSURANCE   Payor: [employer] Plan: [employer] BLUE [employer] PPO   Group Number: [id number] Insurance Type: INDEMNITY   Subscriber Name: ARMINDA MCCLOUD Subscriber : 1970   Subscriber ID: [id number] Coverage Address: [place] [address]   Pat. Rel. to Subscriber: Self Coverage Phone: [phone]   SECONDARY INSURANCE   Payor: N/A Plan: N/A   Group Number:   Insurance Type:     Subscriber Name:   Subscriber :     Subscriber ID:   Coverage Address:     Pat. Rel. to Subscriber:   Coverage Phone:        Contact Serial # ([id number])         2025    Chart ID ([id number])         [facility]  [address] [address]  Phone:  [phone]  Fax:  [phone]        Patient:     Arminda Mccloud MRN:  [id number]   [address] [place] [address] :  1970  SSN:    Phone: [phone] Sex:  F      INSURANCE PAYOR PLAN GROUP # SUBSCRIBER ID   Primary:    BAN [id number]   Admitting Diagnosis: SVT (supraventricular tachycardia) [I47.10]  Order Date:  2025        Inpatient Admission       (Order ID: [id number])     Diagnosis:         Priority:  Routine Expected Date:   Expiration Date:        Interval:   Count:    Level of Care: Telemetry []  Diagnosis: SVT (supraventricular tachycardia) []  Admitting Physician: TRA DANIELS [[id number]]  Attending Physician: TRA DANIELS [[id number]]  Certification: I Certify That Inpatient Hospital Services Are Medically Necessary For Greater Than 2 Midnights     Specimen Type:   Specimen Source:   Specimen Taken Date:   Specimen Taken Time:                  Verbal Order Mode: Telephone with readback   Authorizing Provider: Tra Daniels MD  Authorizing Provider's NPI: [id number]     Order Entered By: Brigette Guzman LPN 2025  9:17 AM     Electronically signed by: Tra Daniels MD 2025  9:35 AM      Tra Daniels MD   Physician  Medicine     H&P      Signed     Date of Service: 25  Creation Time: 25     Signed       Expand All Collapse All         History and Physical     Patient:  Arminda Mccloud  MRN: [id number]     CHIEF COMPLAINT: Heart palpitations     History Obtained From: the patient   PCP: Tra Daniels MD     HISTORY OF PRESENT ILLNESS:   The patient is a 55 y.o. female who presents with a known history of SVT.  Previously cared for by Dr. Renner having undergone cardioversions and chemical therapies in the past is noted to have significant symptomatic SVT in the emergency department.  She was loaded with  amiodarone and started on amiodarone drip and when this failed she was given 12 mg of adenosine.  She has already been evaluated by electrophysiology Dr. Renner to take her for EP study and ablation.  Admitted to my service as her primary care physician.     REVIEW OF SYSTEMS:     Review of Systems   Constitutional: Negative.    HENT: Negative.     Respiratory:  Positive for shortness of breath.    Cardiovascular:  Positive for palpitations.   Gastrointestinal: Negative.    Genitourinary: Negative.    Neurological:  Positive for dizziness.            Past Medical History:  Medical History        Past Medical History:   Diagnosis Date    Arthritis      Asthma      Bunion       Left foot    Callus       Left and right great toe    Chronic fatigue      Colon polyp 1990s    Depression      Diabetes mellitus      Essential hypertension 10/25/2023    Generalized anxiety disorder      Goiter 05/22/2018    Hypercalcemia      Hyperparathyroidism      Hypothyroidism      Ingrown toenail       Hx of    Irritable bowel syndrome      Kidney stone      Lactose intolerance 1980s    Melanoma of back      Multinodular goiter      Obesity      Onychomycosis      Panic      Pelvis fracture 1996     did not require surgery - healed on its own    Plantar fasciitis       Hx of,.corrected approx 10yrs ago    PONV (postoperative nausea and vomiting)      Sleep apnea       supposed to use machine but hasn't gotten it yet    Supraventricular tachycardia      Tendonitis      Vitamin D deficiency              Past Surgical History:  Surgical History         Past Surgical History:   Procedure Laterality Date    CHOLECYSTECTOMY        COLONOSCOPY         Approx 5-10 yrs ago    COLONOSCOPY N/A 05/06/2024     Procedure: COLONOSCOPY WITH ANESTHESIA;  Surgeon: Melissa Smiley MD;  Location: [facility] ENDOSCOPY;  Service: Gastroenterology;  Laterality: N/A;  preop; screening   postop polyps   PCP Polo Lindo    ENDOSCOPY   09/28/2004     esophagus is  "normal-Hiatal Hernia    HAND SURGERY Right      JOINT REPLACEMENT Left       knee    TONSILLECTOMY AND ADENOIDECTOMY        UPPER GASTROINTESTINAL ENDOSCOPY         Approx 20 yrs ago    WIDE EXCISION LESION/MASS TRUNK Left 10/26/2020     Procedure: WIDE EXCISION MELANOMA LEFT BACK AND LEFT AXILLARY SENTINEL LYMPH NODE BIOPSY WITH SPY;  Surgeon: Markell Gresham MD;  Location: [facility] OR;  Service: General;  Laterality: Left;            Medications Prior to Admission:      Prescriptions Prior to Admission   (Not in a hospital admission)         Allergies:  Lamisil [terbinafine] and Buspirone     Social History:   Social History   Social History           Socioeconomic History    Marital status: Single   Tobacco Use    Smoking status: Never    Smokeless tobacco: Never   Vaping Use    Vaping status: Never Used   Substance and Sexual Activity    Alcohol use: Never    Drug use: Never    Sexual activity: Never            Family History:         Family History   Problem Relation Age of Onset    No Known Problems Mother      Hypertension Father      Diabetes Father      Breast cancer Paternal Aunt      Diabetes Paternal Grandmother      Colon cancer Neg Hx      Colon polyps Neg Hx                 Physical Exam:    Vitals: /70   Pulse 71   Temp 98 °F (36.7 °C)   Resp 20   Ht 162.6 cm (64\")   Wt 116 kg (256 lb)   SpO2 94%   BMI 43.94 kg/m²   Physical Exam  Vitals and nursing note reviewed. Exam conducted with a chaperone present.   Constitutional:       Appearance: She is obese.   HENT:      Head: Normocephalic and atraumatic.   Eyes:      Pupils: Pupils are equal, round, and reactive to light.   Cardiovascular:      Rate and Rhythm: Tachycardia present.      Pulses: Normal pulses.      Heart sounds: Normal heart sounds.   Pulmonary:      Effort: Pulmonary effort is normal.      Breath sounds: Normal breath sounds.   Abdominal:      General: Abdomen is flat. Bowel sounds are normal.      Palpations: Abdomen is " soft.   Skin:     General: Skin is warm.      Capillary Refill: Capillary refill takes less than 2 seconds.   Neurological:      General: No focal deficit present.      Mental Status: She is alert.         Lab Results (last 24 hours)         Procedure Component Value Units Date/Time     High Sensitivity Troponin T 1Hr [[id number]]  (Abnormal) Collected: 01/30/25 0621     Specimen: Blood Updated: 01/30/25 0650       HS Troponin T 71 ng/L         Troponin T Numeric Delta 60 ng/L       Narrative:       High Sensitive Troponin T Reference Range:  <14.0 ng/L- Negative Female for AMI  <22.0 ng/L- Negative Male for AMI  >=14 - Abnormal Female indicating possible myocardial injury.  >=22 - Abnormal Male indicating possible myocardial injury.   Clinicians would have to utilize clinical acumen, EKG, Troponin, and serial changes to determine if it is an Acute Myocardial Infarction or myocardial injury due to an underlying chronic condition.           TSH Rfx On Abnormal To Free T4 [[id number]]  (Normal) Collected: 01/30/25 0325     Specimen: Blood Updated: 01/30/25 0605       TSH 1.400 uIU/mL       Comprehensive Metabolic Panel [[id number]]  (Abnormal) Collected: 01/30/25 0325     Specimen: Blood Updated: 01/30/25 0359       Glucose 155 mg/dL         BUN 13 mg/dL         Creatinine 0.72 mg/dL         Sodium 137 mmol/L         Potassium 3.5 mmol/L         Comment: Slight hemolysis detected by analyzer. Result may be falsely elevated.          Chloride 101 mmol/L         CO2 18.0 mmol/L         Calcium 9.4 mg/dL         Total Protein 8.6 g/dL         Albumin 4.3 g/dL         ALT (SGPT) 33 U/L         AST (SGOT) 25 U/L         Alkaline Phosphatase 82 U/L         Total Bilirubin 0.5 mg/dL         Globulin 4.3 gm/dL         A/G Ratio 1.0 g/dL         BUN/Creatinine Ratio 18.1       Anion Gap 18.0 mmol/L         eGFR 100.1 mL/min/1.73       Narrative:       GFR Categories in Chronic Kidney Disease (CKD)                GFR Category           GFR (mL/min/1.73)    Interpretation  G1                       90 or greater          Normal or high (1)  G2                              60-89                Mild decrease (1)  G3a                   45-59                Mild to moderate decrease  G3b                   30-44                Moderate to severe decrease  G4                    15-29                Severe decrease  G5                    14 or less           Kidney failure                                                 (1)In the absence of evidence of kidney disease, neither GFR category G1 or G2 fulfill the criteria for CKD.     eGFR calculation 2021 CKD-EPI creatinine equation, which does not include race as a factor     Magnesium [[id number]]  (Normal) Collected: 01/30/25 0325     Specimen: Blood Updated: 01/30/25 0359       Magnesium 1.9 mg/dL       High Sensitivity Troponin T [[id number]]  (Normal) Collected: 01/30/25 0325     Specimen: Blood Updated: 01/30/25 0349       HS Troponin T 11 ng/L       Narrative:       High Sensitive Troponin T Reference Range:  <14.0 ng/L- Negative Female for AMI  <22.0 ng/L- Negative Male for AMI  >=14 - Abnormal Female indicating possible myocardial injury.  >=22 - Abnormal Male indicating possible myocardial injury.   Clinicians would have to utilize clinical acumen, EKG, Troponin, and serial changes to determine if it is an Acute Myocardial Infarction or myocardial injury due to an underlying chronic condition.           BNP [[id number]]  (Normal) Collected: 01/30/25 0325     Specimen: Blood Updated: 01/30/25 0349       proBNP 90.3 pg/mL       Narrative:       This assay is used as an aid in the diagnosis of individuals suspected of having heart failure. It can be used as an aid in the diagnosis of acute decompensated heart failure (ADHF) in patients presenting with signs and symptoms of ADHF to the emergency department (ED). In addition, NT-proBNP of <300 pg/mL indicates ADHF is not likely.     Age Range         Result Interpretation  NT-proBNP Concentration (pg/mL:        <50             Positive            >450                        Heller                        300-450                          Negative                        <300     50-75           Positive            >900                  Gray                300-900                  Negative            <300        >75             Positive            >1800                  Gray                300-1800                  Negative            <300     Protime-INR [[id number]]  (Normal) Collected: 01/30/25 0315     Specimen: Blood Updated: 01/30/25 0344       Protime 13.6 Seconds         INR 0.99     aPTT [[id number]]  (Normal) Collected: 01/30/25 0315     Specimen: Blood Updated: 01/30/25 0344       PTT 26.8 seconds       CBC & Differential [[id number]]  (Abnormal) Collected: 01/30/25 0315     Specimen: Blood Updated: 01/30/25 0336     Narrative:       The following orders were created for panel order CBC & Differential.  Procedure                               Abnormality         Status                     ---------                               -----------         ------                     CBC Auto Differential[[id number]]        Abnormal            Final result                  Please view results for these tests on the individual orders.     CBC Auto Differential [[id number]]  (Abnormal) Collected: 01/30/25 0315     Specimen: Blood Updated: 01/30/25 0336       WBC 11.92 10*3/mm3         RBC 6.07 10*6/mm3         Hemoglobin 16.8 g/dL         Hematocrit 50.7 %         MCV 83.5 fL         MCH 27.7 pg         MCHC 33.1 g/dL         RDW 12.6 %         RDW-SD 38.7 fl         MPV 9.3 fL         Platelets 439 10*3/mm3         Neutrophil % 60.3 %         Lymphocyte % 29.9 %         Monocyte % 8.3 %         Eosinophil % 0.6 %         Basophil % 0.5 %         Immature Grans % 0.4 %         Neutrophils, Absolute 7.18 10*3/mm3         Lymphocytes, Absolute 3.57 10*3/mm3         Monocytes,  Absolute 0.99 10*3/mm3         Eosinophils, Absolute 0.07 10*3/mm3         Basophils, Absolute 0.06 10*3/mm3         Immature Grans, Absolute 0.05 10*3/mm3         nRBC 0.0 /100 WBC       [place] Draw [[id number]] Collected: 01/30/25 0315     Specimen: Blood Updated: 01/30/25 0330     Narrative:       The following orders were created for panel order [place] Draw.  Procedure                               Abnormality         Status                     ---------                               -----------         ------                     Green Top (Gel)[[id number]]                                  Final result               Lavender Top[[id number]]                                     Final result               Red Top[[id number]]                                          Final result               Light Blue Top[[id number]]                                   Final result                  Please view results for these tests on the individual orders.     Green Top (Gel) [[id number]] Collected: 01/30/25 0315     Specimen: Blood Updated: 01/30/25 0330       Extra Tube Hold for add-ons.       Comment: Auto resulted.        Lavender Top [[id number]] Collected: 01/30/25 0315     Specimen: Blood Updated: 01/30/25 0330       Extra Tube hold for add-on       Comment: Auto resulted        Red Top [[id number]] Collected: 01/30/25 0315     Specimen: Blood Updated: 01/30/25 0330       Extra Tube Hold for add-ons.       Comment: Auto resulted.        Light Blue Top [[id number]] Collected: 01/30/25 0315     Specimen: Blood Updated: 01/30/25 0330       Extra Tube Hold for add-ons.       Comment: Auto resulted                   -----------------------------------------------------------------     Radiology:      XR Chest 1 View     Result Date: 1/30/2025  EXAMINATION: XR CHEST 1 VW-  1/30/2025 2:33 AM  HISTORY: Palpitation  A frontal projection of the chest is compared with the previous study dated 7/19/2018.  The lungs are moderately  well-expanded.  There are chronic appearing interstitial changes in the lungs bilaterally with a few scattered granulomas similar to the previous study.  There is no pleural effusion, pulmonary congestion or pneumothorax.  The cardiac silhouette is moderately enlarged similar to the previous study. This may partly be due to the AP projection.  No acute bony abnormality.       1. No active cardiopulmonary disease.   This report was signed and finalized on 1/30/2025 7:00 AM by Dr. Adis Awan MD.          Assessment and Plan      Primary Problem:  SVT (supraventricular tachycardia)  Polycythemia  Hyperglycemia without history of type 2 diabetes mellitus  Hypothyroidism  Hospital Problem list:    SVT (supraventricular tachycardia)        PMH:  Medical History        Past Medical History:   Diagnosis Date    Arthritis      Asthma      Bunion       Left foot    Callus       Left and right great toe    Chronic fatigue      Colon polyp 1990s    Depression      Diabetes mellitus      Essential hypertension 10/25/2023    Generalized anxiety disorder      Goiter 05/22/2018    Hypercalcemia      Hyperparathyroidism      Hypothyroidism      Ingrown toenail       Hx of    Irritable bowel syndrome      Kidney stone      Lactose intolerance 1980s    Melanoma of back      Multinodular goiter      Obesity      Onychomycosis      Panic      Pelvis fracture 1996     did not require surgery - healed on its own    Plantar fasciitis       Hx of,.corrected approx 10yrs ago    PONV (postoperative nausea and vomiting)      Sleep apnea       supposed to use machine but hasn't gotten it yet    Supraventricular tachycardia      Tendonitis      Vitamin D deficiency              Treatment Plan:  Admit to medical service for on the fourth floor  Telemetry monitoring  Continue amiodarone  Electrophysiology consultation and planned ablation     Disposition: Home     Zelalem Lindo MD 1/30/2025 08:28 CST                   Jackelyn Renner MD  "  Physician  Cardiac EP     Consults      Addendum     Date of Service: 01/30/25 0741  Creation Time: 01/30/25 0741  Consult Orders   Cardiac Electrophysiologist Inpatient Consult [[id number]] ordered by Musa Blackman Jr., MD at 01/30/25 0448          Expand All Collapse All    EP CONSULT NOTE        Subjective        History of Present Illness       EP Problems:  1.  Sustained SVT     Cardiology Problems:  1.  Hypertension     Medical Problems:  1.  Obesity  2.  Asthma  3.  Osteoarthritis  4.  Type 2 diabetes  5.  Hypothyroidism  6.  Generalized anxiety  7.  Multinodular goiter  8.  Obstructive sleep apnea     Arminda Mccloud is a 55 y.o. female with problem list as above for whom EP is consulted regarding sustained SVT.  She has had SVT in the past and has been responsive to adenosine.  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  She ultimately required cardioversion for that episode this time, she presents to the hospital after having another episode of tachycardia at home.  She tried to take a high dose of metoprolol which was ineffective in terminating the episode.  She was given amiodarone and diltiazem here in the hospital without effect.  She ultimately received 4 mg of IV adenosine with successful termination of tachycardia.        Objective  Vital Signs:  /66   Pulse 80   Temp 98 °F (36.7 °C)   Resp 20   Ht 162.6 cm (64\")   Wt 116 kg (256 lb)   SpO2 95%   BMI 43.94 kg/m²   Estimated body mass index is 43.94 kg/m² as calculated from the following:    Height as of this encounter: 162.6 cm (64\").    Weight as of this encounter: 116 kg (256 lb).        Physical Exam  Vitals reviewed.   Constitutional:       Appearance: Normal appearance.   Cardiovascular:      Rate and Rhythm: Normal rate and regular rhythm.      Pulses: Normal pulses.      Heart sounds: Normal heart sounds. No murmur " heard.  Pulmonary:      Effort: Pulmonary effort is normal. No respiratory distress.      Breath sounds: Normal breath sounds.   Skin:     General: Skin is warm and dry.   Neurological:      General: No focal deficit present.      Mental Status: She is alert.   Psychiatric:         Mood and Affect: Mood normal.         Judgment: Judgment normal.               Result Review  :  The following data was reviewed by: Jackelyn Renner MD on 01/30/2025:  CMP Results:   CMP            1/26/2025    10:25 1/30/2025    03:25   CMP   Glucose 97  155    BUN 7  13    Creatinine 0.51  0.72    EGFR 111.8  100.1    Sodium 139  137    Potassium 3.8  3.5    Chloride 104  101    Calcium 8.7  9.4    Total Protein 7.4  8.6    Albumin 3.8  4.3    Globulin 3.6  4.3    Total Bilirubin 0.3  0.5    Alkaline Phosphatase 71  82    AST (SGOT) 19  25    ALT (SGPT) 27  33    Albumin/Globulin Ratio 1.1  1.0    BUN/Creatinine Ratio 13.7  18.1    Anion Gap 9.0  18.0          CBC Results   CBC            1/26/2025    10:25 1/30/2025    03:15   CBC   WBC 10.96  11.92    RBC 5.01  6.07    Hemoglobin 14.3  16.8    Hematocrit 43.6  50.7    MCV 87.0  83.5    MCH 28.5  27.7    MCHC 32.8  33.1    RDW 13.0  12.6    Platelets 311  439          TSH Results:   TSH            1/26/2025    10:25 1/30/2025    03:25   TSH   TSH 0.360  1.400          Troponin 71 (11)     ECG from presentation: SVT, right bundle branch block, mild ST depressions     Chest x-ray from today directly visualized independently reviewed: Normal pericardial silhouette, no infiltrates or pulmonary edema        Assessment[]Expand by Default  Assessment and Plan   Problems:  Sustained SVT  Type II NSTEMI secondary to sustained SVT  Intermittent right bundle branch block     Arminda Mccloud is a 55 y.o. female with problem list as above for whom EP is consulted regarding sustained SVT, type II NSTEMI.  I do suspected her troponin elevation is secondary to the SVT episode.  She did have symptoms of  "neck and jaw pain suggestive of possible demand ischemia.  Despite this, my overall suspicion for underlying CAD is quite low.  The episode is overall consistent with her known history of sustained SVT.  We will see if she can be added onto the inpatient ablation schedule in an expedited manner; otherwise she already has outpatient ablation scheduled which may still be reasonable.     Plan:  -Will check Cath Lab schedule to see if ablation can be expedited  -Stop amiodarone  -Hold on additional beta-blockers for now  -No interventions for type II NSTEMI as it does appear to be clearly tachycardia mediated and not suggestive of coronary plaque rupture                       Part of this note may be an electronic transcription/translation of spoken language to printed text using the Dragon Dictation System.                  Jackelyn Renner MD   Physician  Cardiac EP     Progress Notes      Signed     Date of Service: 01/31/25 0915  Creation Time: 01/31/25 0915     Signed         EP Problems:  1.  AVNRT  -1/30/2025: Slow pathway modification     Cardiology Problems:  1.  Hypertension     Medical Problems:  1.  Obesity  2.  Asthma  3.  Osteoarthritis  4.  Type 2 diabetes  5.  Hypothyroidism  6.  Generalized anxiety  7.  Multinodular goiter  8.  Obstructive sleep apnea      Patient ID:  Arminda Mccloud is a 55 y.o. female with problem list as above as above who EP is following for AVNRT.     Subjective:  She did well following her procedure yesterday.  No bleeding from the access site.  No further episodes of arrhythmias overnight.     Objective:  /58 (BP Location: Left arm, Patient Position: Lying)   Pulse 65   Temp 98.2 °F (36.8 °C)   Resp 16   Ht 162.6 cm (64\")   Wt 117 kg (257 lb 12.8 oz)   SpO2 94%   BMI 44.25 kg/m²      Well-appearing, no acute distress  Lungs are clear to auscultation bilaterally  Regular rate and rhythm  Warm, well-perfused  Suture site removed without evidence of bleeding, hematoma, " drainage     Labs today:        Lab Results   Component Value Date     GLUCOSE 90 2025     CALCIUM 8.7 2025      2025     K 3.7 2025     CO2 25.0 2025     BUN 10 2025     CREATININE 0.61 2025     EGFR 107.1 2025            Lab Results   Component Value Date     WBC 8.05 2025     HGB 14.2 2025     HCT 44.1 2025      2025            Lab Results   Component Value Date     MG 2.0 2025         Assessment:  AVNRT  Type II NSTEMI secondary to sustained SVT  Intermittent right bundle branch block     Plan:  -Okay for discharge home from EP standpoint  -Decrease metoprolol to 25 mg daily  -If doing well after 1 week can stop metoprolol entirely  -Aspirin 325 mg daily for 1 month following ablation     Part of this note may be an electronic transcription/translation of spoken language to printed text using the Dragon Dictation System.                     te/Time Temp Pulse Resp BP Patient Position Device (Oxygen Therapy) Flow (L/min) (Oxygen Therapy) SpO2   25 0732 98.2 (36.8) 65 16 105/58 Lying room air -- 94   25 0401 98 (36.7) 60 18 112/61 Lying room air -- 98   25 0019 98 (36.7) 63 16 98/43 Lying room air -- 97   25 2142 98 (36.7) 59 16 104/48 Lying room air -- 99   01                  [facility] CATH LAB  [address] [facility] [id number] [phone]          [facility] CATH LAB  [address] [address] [facility] [id number] [phone]           Patient Information    Patient Name  Arminda Mccloud MRN  [id number] Legal Sex  Female  (Age)  1970 (55 y.o.)       EP/CRM Study    Accession Number: [id number]   Date of Study: 25   Ordering Provider: Jackelyn Renner MD   Clinical Indications: Sustained SVT [I47.10 (ICD-10-CM)]        Performing Physician  Performing Staff   Primary: Jackelyn Renner MD    Scrub Person: Kenyatta Barone            Anesthesiology Staff    CRNA:  Dagoberto Suazo, GURMEET          Procedures    Ablation SVT        Admission Information    Admission Date/Time Discharge Date/Time Room/Bed   01/30/25  0251  435/1       Patient Hx Of Height, Weight, and Vitals    Height Weight BSA (Calculated - sq m) BMI (Calculated) Retired BMI (kg/m2) Pulse BP                Physicians    Panel Physicians Referring Physician Case Authorizing Physician   Jackelyn Renner MD (Primary)  Jackelyn Renner MD       Indications    Sustained SVT [I47.10 (ICD-10-CM)]       Pre Procedure Diagnosis    SVT (24607)         Conclusion    Contrast: 0 ml  Fluoroscopy: 0 min     Indication: SVT     Initial rhythm: Sinus rhythm  Final rhythm: Sinus rhythm     Pre-Procedure Diagnosis: SVT  Post-Procedure Diagnosis: AVNRT     Procedures  1. SVT ablation - 58428  2.  Intracardiac echo (+[id number])     Procedure     The risks, benefits, alternatives and anticipated results of EP study, sedation, ablation, and cardioversion were explained to the patient and family. Written informed consent was obtained.      The patient was sedated with IV medication. See nursing records for full details. The area overlying the right and left groin was prepped and draped in the usual sterile fashion. 7F, 8F, and 10F hemostasis sheaths were placed in the right femoral vein by the modified Seldinger technique.  Through the 8F hemostasis sheath, a QDOT DF catheter was advanced into the RA. A 3D shell of the IVC, RA, and proximal RV was created using FAM.  Through the 7 Swedish sheath, a decapolar catheter was then placed into the coronary sinus for LA pacing and recording.  Through the 10F sheath, an ICE catheter was advanced into the right atrium.  Baseline images revealed no evidence of left atrial or right atrial thrombus or mass.  The 8F sheath was then exchanged for an agilis sheath for additional stability.     An EP study was then performed as follows:  AH: 84 ms  HV: 49 ms     RVP: Incremental ventricular pacing was  notable for a decremental response with concentric CS activation. VAWB occurred at a paced cycle length of 430 ms.     VEST: Programmed ventricular extrastimulus testing was notable for a decremental response with concentric CS activation. There was no evidence of sustained inducible tachycardia. VAERP occurred at a drive cycle and coupling interval of 500/380ms.     RAP: Incremental atrial pacing was notable for a decremental response with MT transiently exceeding RR. AVWB occurred at a paced cycle length of 390ms.     AEST: Programmed atrial extrastimulus testing was notable for an AH jump but no echo beats or inducible SVT. AVN ERP occurred at a drive cycle and coupling interval of 500/370ms.     Burst atrial pacing revealed evidence of inducible SVT at a cycle length of 440 milliseconds with concentric CS activation and near identical atrial and ventricular activation.  This was suggestive of AVNRT.  Unfortunately, the tachycardia was spontaneously terminating prior to ventricular pacing maneuvers.     Given the patient's evidence of dual AV arelis physiology and inducible SVT consistent with AVNRT, in combination with the patient's known history of adenosine sensitive SVT suggestive of AVNRT, decision was made to proceed with slow pathway modification.     The anatomic triangle of Gardner was mapped and the ablation catheter was placed to the low distal aspect of the triangle.  RF delivery at 50 W power was performed without evidence of anterograde or retrograde AV block.     Following all RF delivery, and repeat EP study was performed as follows:     RAP: Incremental atrial pacing was notable for a decremental response with no evidence of MT>RR. AVWB occurred at a paced cycle length of 440 ms.     Incremental ventricular pacing was notable for VA Wenckebach cycle length of 400 ms with concentric retrograde CS activation.     A final HV interval of 35 ms was seen.     All catheters were removed from the body.   There is no evidence of pericardial effusion at the end of the study.  The venous access site was closed using a figure-of-eight suture.     Findings  1.  Baseline findings of dual AV arelis physiology  2.  Inducible SVT consistent with typical AVNRT  3.  Slow pathway modification  4.  No further evidence of inducible AVNRT or dual AV arelis physiology  5.  No apparent early complications of the procedure.                Procedure Narrative    Risks, benefits and alternatives were discussed with the patient and/or family. Plan is for monitored anesthesia care. Less than 20 mL of estimated blood loss during the case. No specimen was collected during the case.           Radiation Dose Tracking    No radiation tracking information documented.      Medications  (Filter):  CV CONTRAST GROUPER Medications Shown  None                                   Implants    No implant documentation for this case.                     Signed    Electronically signed by Jackelyn Renner MD on 1/30/25 at 1526 CST                 Current Facility-Administered Medications   Medication Dose Route Frequency Provider Last Rate Last Admin    acetaminophen (TYLENOL) tablet 650 mg  650 mg Oral Q4H PRN Jackelyn Renner MD        Or    acetaminophen (TYLENOL) 160 MG/5ML oral solution 650 mg  650 mg Oral Q4H PRN Jackelyn Renner MD        Or    acetaminophen (TYLENOL) suppository 650 mg  650 mg Rectal Q4H PRN Jackelyn Renner MD        aspirin EC tablet 325 mg  325 mg Oral Daily Jackelyn Renner MD        sennosides-docusate (PERICOLACE) 8.6-50 MG per tablet 2 tablet  2 tablet Oral BID PRN Jackelyn Renner MD        And    polyethylene glycol (MIRALAX) packet 17 g  17 g Oral Daily PRN Jackelyn Renner MD        And    bisacodyl (DULCOLAX) EC tablet 5 mg  5 mg Oral Daily PRN Jackelyn Renner MD        And    bisacodyl (DULCOLAX) suppository 10 mg  10 mg Rectal Daily PRN Jackelyn Renner MD        Calcium Replacement - Follow Nurse / BPA Driven Protocol   Not Applicable PRN  Jackelyn Renner MD        Enoxaparin Sodium (LOVENOX) syringe 40 mg  40 mg Subcutaneous Q12H Jackelyn Renner MD   40 mg at 01/31/25 0838    FLUoxetine (PROzac) capsule 40 mg  40 mg Oral Daily Zelalem Lindo MD   40 mg at 01/31/25 0917    Magnesium Standard Dose Replacement - Follow Nurse / BPA Driven Protocol   Not Applicable PRN Jackelyn Renner MD        nitroglycerin (NITROSTAT) SL tablet 0.4 mg  0.4 mg Sublingual Q5 Min PRN Jackelyn Renner MD        Phosphorus Replacement - Follow Nurse / BPA Driven Protocol   Not Applicable PRN Jackelyn Renner MD        potassium chloride (KLOR-CON M20) CR tablet 40 mEq  40 mEq Oral Once Jackelyn Renner MD        Potassium Replacement - Follow Nurse / BPA Driven Protocol   Not Applicable PRN Jackelyn Renner MD        rOPINIRole (REQUIP) tablet 1 mg  1 mg Oral Nightly PRN Dayo Ortiz MD   1 mg at 01/30/25 2034    sodium chloride 0.9 % flush 10 mL  10 mL Intravenous Q12H Jackelyn Renner MD   10 mL at 01/31/25 0839    sodium chloride 0.9 % flush 10 mL  10 mL Intravenous PRN Jackelyn Renner MD        sodium chloride 0.9 % infusion 40 mL  40 mL Intravenous PRN Jackelyn Renner MD

## 2025-01-31 NOTE — PLAN OF CARE
Goal Outcome Evaluation:    Problem: Adult Inpatient Plan of Care  Goal: Plan of Care Review  Outcome: Not Progressing  Flowsheets (Taken 1/31/2025 0402)  Progress: no change  Outcome Evaluation: No c/o pain as of this time. BP has been soft this shift. Right groin site c/d/i, soft, non tender. SR/SB 57-68 per tele. Plan of care ongoing.    Plan of Care Reviewed With: patient

## 2025-01-31 NOTE — PROGRESS NOTES
"EP Problems:  1.  AVNRT  -1/30/2025: Slow pathway modification     Cardiology Problems:  1.  Hypertension     Medical Problems:  1.  Obesity  2.  Asthma  3.  Osteoarthritis  4.  Type 2 diabetes  5.  Hypothyroidism  6.  Generalized anxiety  7.  Multinodular goiter  8.  Obstructive sleep apnea     Patient ID:  Arminda Mccloud is a 55 y.o. female with problem list as above as above who EP is following for AVNRT.    Subjective:  She did well following her procedure yesterday.  No bleeding from the access site.  No further episodes of arrhythmias overnight.    Objective:  /58 (BP Location: Left arm, Patient Position: Lying)   Pulse 65   Temp 98.2 °F (36.8 °C)   Resp 16   Ht 162.6 cm (64\")   Wt 117 kg (257 lb 12.8 oz)   SpO2 94%   BMI 44.25 kg/m²     Well-appearing, no acute distress  Lungs are clear to auscultation bilaterally  Regular rate and rhythm  Warm, well-perfused  Suture site removed without evidence of bleeding, hematoma, drainage    Labs today:  Lab Results   Component Value Date    GLUCOSE 90 01/31/2025    CALCIUM 8.7 01/31/2025     01/31/2025    K 3.7 01/31/2025    CO2 25.0 01/31/2025    BUN 10 01/31/2025    CREATININE 0.61 01/31/2025    EGFR 107.1 01/31/2025     Lab Results   Component Value Date    WBC 8.05 01/31/2025    HGB 14.2 01/31/2025    HCT 44.1 01/31/2025     01/31/2025     Lab Results   Component Value Date    MG 2.0 01/31/2025       Assessment:  AVNRT  Type II NSTEMI secondary to sustained SVT  Intermittent right bundle branch block    Plan:  -Okay for discharge home from EP standpoint  -Decrease metoprolol to 25 mg daily  -If doing well after 1 week can stop metoprolol entirely  -Aspirin 325 mg daily for 1 month following ablation    Part of this note may be an electronic transcription/translation of spoken language to printed text using the Dragon Dictation System.    "

## 2025-01-31 NOTE — PAYOR COMM NOTE
"REF:   [id number]     [facility]  FAX  [phone]     Arminda Mccloud (55 y.o. Female)       Date of Birth   1970    Social Security Number       Address   [address] [place] [address]    Home Phone   [phone]    MRN   [id number]       [facility]    Marital Status   Single                            Admission Date   25    Admission Type   Emergency    Admitting Provider   Zelalem Lindo MD    Attending Provider       Department, Room/Bed   [facility] 4B, 435/1       Discharge Date   2025    Discharge Disposition   Home or Self Care    Discharge Destination                                 Attending Provider: (none)   Allergies: Lamisil [Terbinafine], Buspirone    Isolation: None   Infection: None   Code Status: CPR    Ht: 162.6 cm (64\")   Wt: 117 kg (257 lb 12.8 oz)    Admission Cmt: None   Principal Problem: SVT (supraventricular tachycardia) [I47.10]                   Active Insurance as of 2025       Primary Coverage       Payor Plan Insurance Group Employer/Plan Group    [employer] BLUE [employer]OSS [facility] [employer] BLUE [facility] PPO [id number]       Payor Plan Address Payor Plan Phone Number Payor Plan Fax Number Effective Dates    [address] [phone]  2020 - None Entered    [place] [address]         Subscriber Name Subscriber Birth Date Member ID       ARMINDA MCCLOUD 1970 [id number]                     Emergency Contacts        (Rel.) Home Phone Work Phone Mobile Phone    Radha Mccloud (Mother) [phone] -- --                 Discharge Summary        Zelalem Lindo MD at 25 1221            Hospital Discharge Summary    Arminda Mccloud  :  1970  MRN:  [id number]    Admit date:  2025  Discharge date:  2025    Admitting Physician:    Zelalem Lindo MD    Discharge Diagnoses:      SVT (supraventricular tachycardia)    Sustained SVT      Hospital Course:   The patient is a 55 " y.o. female who presents with a known history of SVT.  Previously cared for by Dr. Renner having undergone cardioversions and chemical therapies in the past is noted to have significant symptomatic SVT in the emergency department.  She was loaded with amiodarone and started on amiodarone drip and when this failed she was given 12 mg of adenosine.  She has already been evaluated by electrophysiology Dr. Renner to take her for EP study and ablation.  Admitted to my service as her primary care physician.  She underwent EP study and subsequent cardiac ablation per Dr. Renner.  Postprocedure she converted to normal sinus rhythm and remained the same for overnight monitoring.  She is asymptomatic rate for discharge home the next morning.    The patient was admitted for the above noted medical/surgical issues. Please see daily progress note for further details concerning their stay. The patient improved throughout their stay and reached maximum medical improvement on the day of discharge. The patient/family agree with the treatment plan as outlined above. All questions concerning their stay were answered prior to discharge. They understand the importance of follow up concerning any abnormal test results.     Physical Exam  Vitals and nursing note reviewed.   Constitutional:       Appearance: She is obese.   HENT:      Head: Normocephalic and atraumatic.      Nose: Nose normal.      Mouth/Throat:      Mouth: Mucous membranes are moist.   Eyes:      Pupils: Pupils are equal, round, and reactive to light.   Cardiovascular:      Rate and Rhythm: Normal rate and regular rhythm.      Pulses: Normal pulses.      Heart sounds: Normal heart sounds.   Pulmonary:      Effort: Pulmonary effort is normal.      Breath sounds: Normal breath sounds.   Abdominal:      General: Abdomen is flat. Bowel sounds are normal.      Palpations: Abdomen is soft.   Skin:     General: Skin is warm.      Capillary Refill: Capillary refill takes less than 2  seconds.   Neurological:      Mental Status: She is alert.         Discharge Medications:         Discharge Medications        New Medications        Instructions Start Date   aspirin 325 MG EC tablet   325 mg, Oral, Daily   Start Date: February 1, 2025     pramipexole 0.5 MG tablet  Commonly known as: Mirapex   0.5 mg, Oral, Nightly             Changes to Medications        Instructions Start Date   FLUoxetine 20 MG capsule  Commonly known as: PROzac  What changed:   medication strength  how much to take   60 mg, Oral, Daily             Continue These Medications        Instructions Start Date   albuterol sulfate  (90 Base) MCG/ACT inhaler  Commonly known as: PROVENTIL HFA;VENTOLIN HFA;PROAIR HFA   2 puffs, Inhalation, Every 4 Hours PRN      albuterol (2.5 MG/3ML) 0.083% nebulizer solution  Commonly known as: PROVENTIL   2.5 mg, Nebulization, Every 4 Hours PRN      cetirizine 10 MG tablet  Commonly known as: zyrTEC   10 mg, Oral, Daily      metoprolol succinate XL 50 MG 24 hr tablet  Commonly known as: TOPROL-XL   50 mg, Oral, Daily      Mounjaro 15 MG/0.5ML solution auto-injector  Generic drug: Tirzepatide   15 mg, Subcutaneous, Weekly               Activity: As tolerated    Diet: Cardiac    Consults: Dr. Renner    Significant Diagnostic Studies:    XR Chest 1 View    Result Date: 1/30/2025  1. No active cardiopulmonary disease.   This report was signed and finalized on 1/30/2025 7:00 AM by Dr. Adis Awan MD.            Treatments:   Cardiac ablation    Disposition:   Home    Time spent on discharge including discussion with patient/family, SW, and coordination of care.     Follow up with Zelalem Lindo MD in 1 week.    Signed:  Zelalem Lindo MD   1/31/2025, 12:22 CST    Electronically signed by Zelalem Lindo MD at 01/31/25 1223       Discharge Order (From admission, onward)       Start     Ordered    01/31/25 1220  Discharge patient  Once        Expected Discharge Date: 01/31/25    Expected Discharge Time: Afternoon   Discharge Disposition: Home or Self Care   Physician of Record for Attribution - Please select from Treatment Team: TRA DANIELS [[id number]]   Review needed by CMO to determine Physician of Record: No   Please choose which facility the patient is currently admitted if they are being discharged to another facility or unit.: [facility]   Mode: Family      Question Answer Comment   Physician of Record for Attribution - Please select from Treatment Team TRA DANIELS    Review needed by CMO to determine Physician of Record No    Please choose which facility the patient is currently admitted if they are being discharged to another facility or unit. [facility]    Mode: Family        01/31/25 1221

## 2025-02-05 LAB
QT INTERVAL: 290 MS
QT INTERVAL: 390 MS
QT INTERVAL: 450 MS
QTC INTERVAL: 455 MS
QTC INTERVAL: 460 MS
QTC INTERVAL: 476 MS

## 2025-03-18 ENCOUNTER — OFFICE VISIT (OUTPATIENT)
Dept: CARDIOLOGY | Facility: CLINIC | Age: 55
End: 2025-03-18
Payer: COMMERCIAL

## 2025-03-18 VITALS
SYSTOLIC BLOOD PRESSURE: 126 MMHG | DIASTOLIC BLOOD PRESSURE: 100 MMHG | BODY MASS INDEX: 43.71 KG/M2 | HEART RATE: 92 BPM | WEIGHT: 256 LBS | OXYGEN SATURATION: 98 % | HEIGHT: 64 IN

## 2025-03-18 DIAGNOSIS — I47.19 AVNRT (AV NODAL RE-ENTRY TACHYCARDIA): ICD-10-CM

## 2025-03-18 DIAGNOSIS — I47.10 SUSTAINED SVT: Primary | ICD-10-CM

## 2025-03-18 NOTE — PROGRESS NOTES
"[facility] Electrophysiology   Reason For Visit:  SVT (supraventricular tachycardia)     Subjective        EP Problems:  1.  AVNRT  -1/30/2025: Slow pathway modification     Cardiology Problems:  1.  Hypertension     Medical Problems:  1.  Obesity  2.  Asthma  3.  Osteoarthritis  4.  Type 2 diabetes  5.  Hypothyroidism  6.  Generalized anxiety  7.  Multinodular goiter  8.  Obstructive sleep apnea     Arminda Mccloud is a 55 y.o. female with above pertinent PMH who presents for follow-up of SVT ablation.    Since her ablation she has been doing well.  The first few weeks after the ablation she felt some fast heartbeat, however this is settled down.  She is continued on the aspirin.  She denies any complaints at this time.    ROS: Pertinent findings as noted above        Pertinent past medical, surgical, family, and social history were reviewed.      Current Outpatient Medications:     albuterol (PROVENTIL) (2.5 MG/3ML) 0.083% nebulizer solution, Take 2.5 mg by nebulization Every 4 (Four) Hours As Needed for Wheezing., Disp: , Rfl:     albuterol sulfate  (90 Base) MCG/ACT inhaler, Inhale 2 puffs Every 4 (Four) Hours As Needed for Wheezing., Disp: , Rfl:     cetirizine (zyrTEC) 10 MG tablet, Take 1 tablet by mouth Daily., Disp: , Rfl:     FLUoxetine (PROzac) 20 MG capsule, Take 3 capsules by mouth Daily for 30 days., Disp: 90 capsule, Rfl: 0    Tirzepatide (Mounjaro) 15 MG/0.5ML solution pen-injector pen, Inject 0.5 mL under the skin into the appropriate area as directed 1 (One) Time Per Week., Disp: , Rfl:      Objective   Vital Signs:  /100   Pulse 92   Ht 162.6 cm (64.02\")   Wt 116 kg (256 lb)   SpO2 98%   BMI 43.92 kg/m²   Estimated body mass index is 43.92 kg/m² as calculated from the following:    Height as of this encounter: 162.6 cm (64.02\").    Weight as of this encounter: 116 kg (256 lb).      Constitutional:       Appearance: Healthy appearance. Not in distress.   Pulmonary:      " Effort: Pulmonary effort is normal.      Breath sounds: Normal breath sounds.   Cardiovascular:      PMI at left midclavicular line. Normal rate. Regular rhythm. Normal S1. Normal S2.       Murmurs: There is no murmur.      No gallop.  No click. No rub.   Pulses:     Intact distal pulses.   Edema:     Peripheral edema absent.   Neurological:      Mental Status: Alert and oriented to person, place and time.        Result Review :  The following data was reviewed by: BECKY Chambers on 03/18/2025:  CMP   CMP          1/26/2025    10:25 1/30/2025    03:25 1/30/2025    13:11 1/31/2025    02:50   CMP   Glucose 97  155  114  90    BUN 7  13  10  10    Creatinine 0.51  0.72  0.51  0.61    EGFR 111.8  100.1  111.8  107.1    Sodium 139  137  137  137    Potassium 3.8  3.5  3.9  3.7    Chloride 104  101  103  103    Calcium 8.7  9.4  9.1  8.7    Total Protein 7.4  8.6      Albumin 3.8  4.3      Globulin 3.6  4.3      Total Bilirubin 0.3  0.5      Alkaline Phosphatase 71  82      AST (SGOT) 19  25      ALT (SGPT) 27  33      Albumin/Globulin Ratio 1.1  1.0      BUN/Creatinine Ratio 13.7  18.1  19.6  16.4    Anion Gap 9.0  18.0  12.0  9.0      CBC   CBC          1/26/2025    10:25 1/30/2025    03:15 1/30/2025    13:11 1/31/2025    02:50   CBC   WBC 10.96  11.92  10.44  8.05    RBC 5.01  6.07  5.73  5.08    Hemoglobin 14.3  16.8  16.1  14.2    Hematocrit 43.6  50.7  48.3  44.1    MCV 87.0  83.5  84.3  86.8    MCH 28.5  27.7  28.1  28.0    MCHC 32.8  33.1  33.3  32.2    RDW 13.0  12.6  13.0  13.1    Platelets 311  439  408  297           ECG 12 Lead    Date/Time: 3/18/2025 1:51 PM  Performed by: Kana Quinn APRN    Authorized by: Reasor, Kana S, APRN  Comparison: compared with previous ECG from 1/30/2025  Similar to previous ECG  Comparison to previous ECG: Normal sinus rhythm  Rhythm: sinus rhythm  Rate: normal  QRS axis: normal    Clinical impression: normal ECG            Assessment and Plan   Diagnoses and all  orders for this visit:    1. Sustained SVT (Primary)  2. AVNRT (AV arelis re-entry tachycardia)  -Patient doing well at this time with no significant recurrence of SVT  - At this time may stop aspirin as she is more than 30 days for out from procedure  -Recommend continue lifestyle modifications to improve diabetes management as well as hypertension  - Follow-up in the EP clinic in 6-months                   I spent 2 minutes on the separately reported service of EKG interpretation. This time is not included in the time used to support the E/M service also reported today.      Follow Up   Return in about 6 months (around 9/18/2025).  Patient was given instructions and counseling regarding her condition or for health maintenance advice. Please see specific information pulled into the AVS if appropriate.       Part of this note may be an electronic transcription/translation of spoken language to printed text using the Dragon Dictation System.

## (undated) DEVICE — SYS COL WAST NAMIC IV SGL/LN FML/FIT W/VNT/SPK/HD 72IN

## (undated) DEVICE — SI AVANTI+ 7F STD W/GW  NO OBT: Brand: AVANTI

## (undated) DEVICE — Device: Brand: DEFENDO AIR/WATER/SUCTION AND BIOPSY VALVE

## (undated) DEVICE — THE SINGLE USE ETRAP – POLYP TRAP IS USED FOR SUCTION RETRIEVAL OF ENDOSCOPICALLY REMOVED POLYPS.: Brand: ETRAP

## (undated) DEVICE — SUT SILK 2/0 SUTUPAK TIES 24IN SA75H

## (undated) DEVICE — PK TURNOVER RM ADV

## (undated) DEVICE — DRSNG TELFA PAD NONADH STR 1S 3X8IN

## (undated) DEVICE — BI-DIRECTIONAL NAVIGATION CATHETER, NAV, D-F: Brand: QDOT MICRO

## (undated) DEVICE — STPCK 3/WY HP M/RA W/OFF/HNDL 1050PSI STRL

## (undated) DEVICE — SOL NS 500ML

## (undated) DEVICE — SUT SILK 3/0 SUTUPAK TIES 24IN SA74H

## (undated) DEVICE — Device: Brand: REFERENCE PATCH CARTO 3

## (undated) DEVICE — SUT VIC 2/0 SUTUPAK TIES 18IN J911T

## (undated) DEVICE — SHEET,DRAPE,53X77,STERILE: Brand: MEDLINE

## (undated) DEVICE — 3M™ STERI-STRIP™ REINFORCED ADHESIVE SKIN CLOSURES, R1546, 1/4 IN X 4 IN (6 MM X 100 MM), 10 STRIPS/ENVELOPE: Brand: 3M™ STERI-STRIP™

## (undated) DEVICE — SHEET, T, LAPAROTOMY, STERILE: Brand: MEDLINE

## (undated) DEVICE — SI AVANTI+ 10F STD W/GW: Brand: AVANTI

## (undated) DEVICE — SNAR POLYP CAPTIVATOR RND STFF 2.4 240CM 10MM 1P/U

## (undated) DEVICE — DRN JP RND NO TROC SIL 15F 3/16IN

## (undated) DEVICE — SUT VIC 3/0 SUTUPAK TIES 18IN J910T

## (undated) DEVICE — TBG PRESS/MONITR FIX M/F LL A/ 48IN STRL

## (undated) DEVICE — KT NDL GUIDE STRL 18GA

## (undated) DEVICE — GLV SURG TRIUMPH MICRO PF LTX 7.5 STRL

## (undated) DEVICE — DRSNG SURESITE WNDW 4X4.5

## (undated) DEVICE — SLV CBL IVL 5X96IN

## (undated) DEVICE — ARM SLING II: Brand: DEROYAL

## (undated) DEVICE — SOLIDIFIER LIQ LIQUILOC/PLUS W/TREAT 2000CC

## (undated) DEVICE — RESERVOIR,SUCTION,100CC,SILICONE: Brand: MEDLINE

## (undated) DEVICE — LIMB HOLDER, WRIST/ANKLE: Brand: DEROYAL

## (undated) DEVICE — TOWEL,OR,DSP,ST,BLUE,STD,4/PK,20PK/CS: Brand: MEDLINE

## (undated) DEVICE — SENSR O2 OXIMAX FNGR A/ 18IN NONSTR

## (undated) DEVICE — CVR PROB GEN PURP W ISOSILK 6X48

## (undated) DEVICE — ADHS LIQ MASTISOL 2/3ML

## (undated) DEVICE — PAD, DEFIB, ADULT, RADIOTRANS, PHYSIO: Brand: MEDLINE

## (undated) DEVICE — SI AVANTI+ 8F STD W/GW  NO OBT: Brand: AVANTI

## (undated) DEVICE — STCKNT IMPERV 9X36IN STRL

## (undated) DEVICE — Device: Brand: WEBSTER CS

## (undated) DEVICE — ELECTRD BLD EZ CLN MOD XLNG 2.75IN

## (undated) DEVICE — SOL IRR NACL 0.9PCT BO 1000ML

## (undated) DEVICE — PK CATH CARD 30 CA/4

## (undated) DEVICE — KT DRP SPY/PHI W/ICG 25MG STRL

## (undated) DEVICE — HARMONIC FOCUS SHEARS 9CM LENGTH + ADAPTIVE TISSUE TECHNOLOGY FOR USE WITH BLUE HAND PIECE ONLY: Brand: HARMONIC FOCUS

## (undated) DEVICE — VAGINAL PREP TRAY: Brand: MEDLINE INDUSTRIES, INC.

## (undated) DEVICE — SUT VIC 3/0 RB1 27IN UD VCP215H

## (undated) DEVICE — SPNG GZ WOVN 4X4IN 12PLY 10/BX STRL

## (undated) DEVICE — NDL HYPO PRECISIONGLIDE REG 25G 1 1/2

## (undated) DEVICE — CUFF,BP,DISP,1 TUBE,ADULT,HP: Brand: MEDLINE

## (undated) DEVICE — PAD MINOR UNIVERSAL: Brand: MEDLINE INDUSTRIES, INC.

## (undated) DEVICE — THE CHANNEL CLEANING BRUSH IS A NYLON FLEXI BRUSH ATTACHED TO A FLEXIBLE PLASTIC SHEATH DESIGNED TO SAFELY REMOVE DEBRIS FROM FLEXIBLE ENDOSCOPES.

## (undated) DEVICE — 3M™ IOBAN™ 2 ANTIMICROBIAL INCISE DRAPE 6650EZ: Brand: IOBAN™ 2

## (undated) DEVICE — YANKAUER,BULB TIP WITH VENT: Brand: ARGYLE

## (undated) DEVICE — SUREFIT, DUAL DISPERSIVE ELECTRODE, CONTACT QUALITY MONITOR: Brand: SUREFIT

## (undated) DEVICE — MASK,OXYGEN,MED CONC,ADLT,7' TUB, UC: Brand: PENDING

## (undated) DEVICE — INTRO STEER AGILIS NXT LG/CURL 8.5F71CM

## (undated) DEVICE — INTENDED FOR TISSUE SEPARATION, AND OTHER PROCEDURES THAT REQUIRE A SHARP SURGICAL BLADE TO PUNCTURE OR CUT.: Brand: BARD-PARKER ® STAINLESS STEEL BLADES

## (undated) DEVICE — Device: Brand: SMARTABLATE

## (undated) DEVICE — SUT VIC 4/0 P3 18IN UD VCP494H

## (undated) DEVICE — Device: Brand: SOUNDSTAR